# Patient Record
Sex: MALE | Race: WHITE | NOT HISPANIC OR LATINO | URBAN - METROPOLITAN AREA
[De-identification: names, ages, dates, MRNs, and addresses within clinical notes are randomized per-mention and may not be internally consistent; named-entity substitution may affect disease eponyms.]

---

## 2017-07-13 ENCOUNTER — EMERGENCY (EMERGENCY)
Facility: HOSPITAL | Age: 82
LOS: 1 days | Discharge: HOME | End: 2017-07-13
Attending: EMERGENCY MEDICINE
Payer: MEDICARE

## 2017-07-13 VITALS
SYSTOLIC BLOOD PRESSURE: 122 MMHG | RESPIRATION RATE: 18 BRPM | DIASTOLIC BLOOD PRESSURE: 69 MMHG | OXYGEN SATURATION: 96 % | HEART RATE: 60 BPM

## 2017-07-13 VITALS
WEIGHT: 160.06 LBS | HEART RATE: 82 BPM | RESPIRATION RATE: 18 BRPM | TEMPERATURE: 98 F | OXYGEN SATURATION: 100 % | HEIGHT: 69 IN | DIASTOLIC BLOOD PRESSURE: 61 MMHG | SYSTOLIC BLOOD PRESSURE: 114 MMHG

## 2017-07-13 PROCEDURE — 70486 CT MAXILLOFACIAL W/O DYE: CPT

## 2017-07-13 PROCEDURE — 99284 EMERGENCY DEPT VISIT MOD MDM: CPT | Mod: 25

## 2017-07-13 PROCEDURE — 70450 CT HEAD/BRAIN W/O DYE: CPT

## 2017-07-13 PROCEDURE — 70450 CT HEAD/BRAIN W/O DYE: CPT | Mod: 26

## 2017-07-13 PROCEDURE — 99284 EMERGENCY DEPT VISIT MOD MDM: CPT

## 2017-07-13 PROCEDURE — 70486 CT MAXILLOFACIAL W/O DYE: CPT | Mod: 26

## 2017-07-13 NOTE — ED PROVIDER NOTE - CARE PLAN
Instructions for follow-up, activity and diet:	1. Follow up with your PMD within 48-72 hours.  2. Rest, Take Tylenol 650mg 1 tab every 4-6 hours as needed for pain .   3. Any nausea, vomiting, worsening pain, dizziness, changes in vision return to ER Principal Discharge DX:	Contusion of face, initial encounter  Instructions for follow-up, activity and diet:	1. Follow up with your PMD within 48-72 hours.  2. Rest, Take Tylenol 650mg 1 tab every 4-6 hours as needed for pain .   3. Any nausea, vomiting, worsening pain, dizziness, changes in vision return to ER

## 2017-07-13 NOTE — ED PROVIDER NOTE - MEDICAL DECISION MAKING DETAILS
Head trauma with no LOC and normal neuro exam on AC- CT head/CT maxfac; head precautions, tylenol, PMD follow up

## 2017-07-13 NOTE — ED PROVIDER NOTE - PROGRESS NOTE DETAILS
Imaging studies describe no acute fracture or intracranial bleeding. Patient stable for discharge. I agree with PA assessment and plan. Dr. Lincoln Winter

## 2017-07-13 NOTE — ED ADULT NURSE NOTE - OBJECTIVE STATEMENT
89y male pt brought by daughter in law who is a physician s/p mechanical fall last evening. Pt states that he tripped over treadmill in a room, fell on right side face, no LOC, on Pradaxa for Afib. +ecchymosis around right eye, seen by Optho this morning - no acute finding on right eye. About 0.5inch laceration noted below right eyebrow, no active bleeding noted. Denies any dizziness, chest pain or sob, no nausea/vomit. Ambulating with cane at baseline, MAEx4, no distress or pain noted.

## 2017-07-13 NOTE — ED PROVIDER NOTE - OBJECTIVE STATEMENT
89yom pmhx of DM HTN HLD hx of Afib on pradaxa here with daughter s/p mechanical fall last night in his room with head injury and facial injury. pt was able to get up. no complaints. Went to ophtho today with eye exam fine but sent in due to head injury on AC. pt denies any pain or any neuro issues. Wants to go home; daughter at bedside.

## 2017-07-13 NOTE — ED PROVIDER NOTE - ATTENDING CONTRIBUTION TO CARE
89 year-old male patient who sustained fall at home yesterday with no LOC, resulting in right facial brusing and ecchymoses. Patient with no gross neurologic deficits. Imaging studies describe no acute fracture or intracranial bleeding. Patient stable for discharge. I agree with PA assessment and plan. Will discharge home. Dr. Lincoln Winter

## 2018-01-31 ENCOUNTER — RX RENEWAL (OUTPATIENT)
Age: 83
End: 2018-01-31

## 2018-01-31 DIAGNOSIS — I10 ESSENTIAL (PRIMARY) HYPERTENSION: ICD-10-CM

## 2018-01-31 RX ORDER — METOPROLOL TARTRATE 25 MG/1
25 TABLET, FILM COATED ORAL
Qty: 90 | Refills: 0 | Status: ACTIVE | COMMUNITY
Start: 2018-01-31 | End: 1900-01-01

## 2018-02-09 ENCOUNTER — APPOINTMENT (OUTPATIENT)
Dept: CARDIOLOGY | Facility: CLINIC | Age: 83
End: 2018-02-09

## 2018-02-14 ENCOUNTER — APPOINTMENT (OUTPATIENT)
Dept: CARDIOLOGY | Facility: CLINIC | Age: 83
End: 2018-02-14

## 2018-08-18 ENCOUNTER — EMERGENCY (EMERGENCY)
Facility: HOSPITAL | Age: 83
LOS: 0 days | Discharge: HOME | End: 2018-08-18
Attending: EMERGENCY MEDICINE | Admitting: EMERGENCY MEDICINE
Payer: MEDICARE

## 2018-08-18 VITALS
DIASTOLIC BLOOD PRESSURE: 74 MMHG | OXYGEN SATURATION: 97 % | RESPIRATION RATE: 18 BRPM | HEART RATE: 69 BPM | TEMPERATURE: 98 F | SYSTOLIC BLOOD PRESSURE: 109 MMHG

## 2018-08-18 VITALS — HEIGHT: 69 IN | WEIGHT: 149.91 LBS

## 2018-08-18 DIAGNOSIS — E11.9 TYPE 2 DIABETES MELLITUS WITHOUT COMPLICATIONS: ICD-10-CM

## 2018-08-18 DIAGNOSIS — S22.32XA FRACTURE OF ONE RIB, LEFT SIDE, INITIAL ENCOUNTER FOR CLOSED FRACTURE: ICD-10-CM

## 2018-08-18 DIAGNOSIS — Z79.4 LONG TERM (CURRENT) USE OF INSULIN: ICD-10-CM

## 2018-08-18 DIAGNOSIS — S49.92XA UNSPECIFIED INJURY OF LEFT SHOULDER AND UPPER ARM, INITIAL ENCOUNTER: ICD-10-CM

## 2018-08-18 DIAGNOSIS — R29.6 REPEATED FALLS: ICD-10-CM

## 2018-08-18 DIAGNOSIS — I10 ESSENTIAL (PRIMARY) HYPERTENSION: ICD-10-CM
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
## 2018-08-18 DIAGNOSIS — E78.5 HYPERLIPIDEMIA, UNSPECIFIED: ICD-10-CM

## 2018-08-18 DIAGNOSIS — S00.83XA CONTUSION OF OTHER PART OF HEAD, INITIAL ENCOUNTER: ICD-10-CM
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
## 2018-08-18 DIAGNOSIS — S50.812A ABRASION OF LEFT FOREARM, INITIAL ENCOUNTER: ICD-10-CM

## 2018-08-18 PROCEDURE — 71250 CT THORAX DX C-: CPT | Mod: 26

## 2018-08-18 PROCEDURE — 70450 CT HEAD/BRAIN W/O DYE: CPT | Mod: 26

## 2018-08-18 PROCEDURE — 72125 CT NECK SPINE W/O DYE: CPT | Mod: 26

## 2018-08-18 PROCEDURE — 99284 EMERGENCY DEPT VISIT MOD MDM: CPT

## 2018-08-18 RX ORDER — TETANUS TOXOID, REDUCED DIPHTHERIA TOXOID AND ACELLULAR PERTUSSIS VACCINE, ADSORBED 5; 2.5; 8; 8; 2.5 [IU]/.5ML; [IU]/.5ML; UG/.5ML; UG/.5ML; UG/.5ML
0.5 SUSPENSION INTRAMUSCULAR ONCE
Qty: 0 | Refills: 0 | Status: COMPLETED | OUTPATIENT
Start: 2018-08-18 | End: 2018-08-18

## 2018-08-18 RX ADMIN — TETANUS TOXOID, REDUCED DIPHTHERIA TOXOID AND ACELLULAR PERTUSSIS VACCINE, ADSORBED 0.5 MILLILITER(S): 5; 2.5; 8; 8; 2.5 SUSPENSION INTRAMUSCULAR at 18:31

## 2018-08-18 NOTE — ED STATDOCS - PROGRESS NOTE DETAILS
89M hx htn hld dm presents to the ED s/p fall out of bed - fell 2 weeks ago and again 2 days ago - now with bruising over right head as well as significant skin tear to left forearm - given age and bruising will have pt evaluated further in main. Bhupinder Ken M.D., Attending Physician

## 2018-08-18 NOTE — ED PROVIDER NOTE - ATTENDING CONTRIBUTION TO CARE
I, Jon Donald, performed the initial face to face bedside interview with this patient regarding history of present illness, review of symptoms and relevant past medical, social and family history.  I completed an independent physical examination.  I was the initial provider who evaluated this patient. I have signed out the follow up of any pending tests (i.e. labs, radiological studies) to the ACP.  I have communicated the patient’s plan of care and disposition with the ACP.  The history, relevant review of systems, past medical and surgical history, medical decision making, and physical examination was documented by the scribe in my presence and I attest to the accuracy of the documentation.

## 2018-08-18 NOTE — ED ADULT NURSE REASSESSMENT NOTE - NS ED NURSE REASSESS COMMENT FT1
patient given incentive spirometer, demonstrated back correct use of incentive spirometer. discharged home with wife. ambulatory with steady gait using cane. written and verbal discharge and followup instructions given to patient, patient verbalized back understanding. discharged home from ED at 2029.

## 2018-08-18 NOTE — ED PROVIDER NOTE - SKIN, MLM
2 deep skin tears 4cm subcutaneous on dorsum to L forearm. +5 cm ecchymosis r frontal scalp. no obvious evidence of skull fx. +bruising to b/l arms. ,+ 4 cm diameter of left chest wall, no chest wall tenderness. Skin normal color for race, warm, dry and intact. No evidence of rash.

## 2018-08-18 NOTE — ED PROVIDER NOTE - PROGRESS NOTE DETAILS
89 yo male with a PMH of DM, HTN, HLD, presents with 2 fall and abrasion to the L upper extremity. Pt slipped off his bed 2 weeks ago and sustained 2 deep abrasions. Pt also fell 2 days ago and sustained another skin tear to the same L forearm. CT head, neck, and chest fue to ecchymosis on the L chest. -Keo Echols PA-C Pt advised of results of rib facture and given incentive spirometer. Advised to f/u with wound care center for further evaluation or ortho hand for possible graft. Pt and wife agree with plan.

## 2018-08-18 NOTE — ED PROVIDER NOTE - OBJECTIVE STATEMENT
89 y/o M with a PMHx of DM, HTN, HLD  presents to the ED s/p fall two weeks ago. Pt slid off his bed  suffering a laceration on L forearm. Two days ago pt fell again off his bed and sustained another skin injury, +hit head and sustained +R frontal scalp ecchymosis. No obvious deformity. Denies back pain, neck pain, two days ago fell again with injury. Denies LOC, dizziness. Pt is not up to date on Tetanus. 89 y/o M with a PMHx of DM, HTN, HLD presents to the ED s/p fall two weeks ago. Pt slid off his bed suffering a laceration on L forearm. Two days ago pt fell again off his bed and sustained another skin injury, +hit head and sustained +R frontal scalp ecchymosis. No obvious deformity. Denies back pain, neck pain. Denies LOC, dizziness. Pt is not up to date on Tetanus.

## 2018-08-18 NOTE — ED PROVIDER NOTE - CARE PLAN
Principal Discharge DX:	Closed fracture of one rib of left side, initial encounter  Secondary Diagnosis:	Abrasion of left forearm, initial encounter  Secondary Diagnosis:	Fall from bed, initial encounter

## 2018-08-18 NOTE — ED ADULT NURSE NOTE - NSIMPLEMENTINTERV_GEN_ALL_ED
Implemented All Fall with Harm Risk Interventions:  [place] to call system. Call bell, personal items and telephone within reach. Instruct patient to call for assistance. Room bathroom lighting operational. Non-slip footwear when patient is off stretcher. Physically safe environment: no spills, clutter or unnecessary equipment. Stretcher in lowest position, wheels locked, appropriate side rails in place. Provide visual cue, wrist band, yellow gown, etc. Monitor gait and stability. Monitor for mental status changes and reorient to person, place, and time. Review medications for side effects contributing to fall risk. Reinforce activity limits and safety measures with patient and family. Provide visual clues: red socks.

## 2018-08-18 NOTE — ED ADULT TRIAGE NOTE - CHIEF COMPLAINT QUOTE
fall, hit arm on dresser. Denies LOC. Denies any other pain. Left arm is bandaged. pulse/motor/sensory intact. Denies blood thinners

## 2019-03-07 ENCOUNTER — INPATIENT (INPATIENT)
Facility: HOSPITAL | Age: 84
LOS: 3 days | Discharge: NURSING, REHAB OR LONG-TERM CARE | End: 2019-03-11
Attending: HOSPITALIST | Admitting: HOSPITALIST
Payer: MEDICARE

## 2019-03-07 VITALS
HEIGHT: 69 IN | RESPIRATION RATE: 15 BRPM | OXYGEN SATURATION: 92 % | SYSTOLIC BLOOD PRESSURE: 118 MMHG | DIASTOLIC BLOOD PRESSURE: 71 MMHG | TEMPERATURE: 98 F | HEART RATE: 71 BPM | WEIGHT: 149.91 LBS

## 2019-03-07 DIAGNOSIS — S81.811A LACERATION WITHOUT FOREIGN BODY, RIGHT LOWER LEG, INITIAL ENCOUNTER: ICD-10-CM

## 2019-03-07 DIAGNOSIS — E44.0 MODERATE PROTEIN-CALORIE MALNUTRITION: ICD-10-CM

## 2019-03-07 DIAGNOSIS — R54 AGE-RELATED PHYSICAL DEBILITY: ICD-10-CM

## 2019-03-07 DIAGNOSIS — L03.119 CELLULITIS OF UNSPECIFIED PART OF LIMB: ICD-10-CM

## 2019-03-07 DIAGNOSIS — I25.10 ATHEROSCLEROTIC HEART DISEASE OF NATIVE CORONARY ARTERY WITHOUT ANGINA PECTORIS: ICD-10-CM

## 2019-03-07 DIAGNOSIS — S81.812A LACERATION WITHOUT FOREIGN BODY, LEFT LOWER LEG, INITIAL ENCOUNTER: ICD-10-CM
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
## 2019-03-07 DIAGNOSIS — I48.91 UNSPECIFIED ATRIAL FIBRILLATION: ICD-10-CM

## 2019-03-07 DIAGNOSIS — E11.9 TYPE 2 DIABETES MELLITUS WITHOUT COMPLICATIONS: ICD-10-CM

## 2019-03-07 LAB
ALBUMIN SERPL ELPH-MCNC: 2.6 G/DL — LOW (ref 3.3–5)
ALP SERPL-CCNC: 83 U/L — SIGNIFICANT CHANGE UP (ref 40–120)
ALT FLD-CCNC: 23 U/L — SIGNIFICANT CHANGE UP (ref 12–78)
ANION GAP SERPL CALC-SCNC: 7 MMOL/L — SIGNIFICANT CHANGE UP (ref 5–17)
APPEARANCE UR: CLEAR — SIGNIFICANT CHANGE UP
APTT BLD: 32 SEC — SIGNIFICANT CHANGE UP (ref 27.5–36.3)
AST SERPL-CCNC: 35 U/L — SIGNIFICANT CHANGE UP (ref 15–37)
BACTERIA # UR AUTO: ABNORMAL
BASOPHILS # BLD AUTO: 0.02 K/UL — SIGNIFICANT CHANGE UP (ref 0–0.2)
BASOPHILS NFR BLD AUTO: 0.1 % — SIGNIFICANT CHANGE UP (ref 0–2)
BILIRUB SERPL-MCNC: 1.3 MG/DL — HIGH (ref 0.2–1.2)
BILIRUB UR-MCNC: NEGATIVE — SIGNIFICANT CHANGE UP
BUN SERPL-MCNC: 28 MG/DL — HIGH (ref 7–23)
CALCIUM SERPL-MCNC: 8.1 MG/DL — LOW (ref 8.5–10.1)
CHLORIDE SERPL-SCNC: 99 MMOL/L — SIGNIFICANT CHANGE UP (ref 96–108)
CO2 SERPL-SCNC: 27 MMOL/L — SIGNIFICANT CHANGE UP (ref 22–31)
COLOR SPEC: YELLOW — SIGNIFICANT CHANGE UP
COMMENT - URINE: SIGNIFICANT CHANGE UP
CREAT SERPL-MCNC: 1.21 MG/DL — SIGNIFICANT CHANGE UP (ref 0.5–1.3)
DIFF PNL FLD: ABNORMAL
EOSINOPHIL # BLD AUTO: 0 K/UL — SIGNIFICANT CHANGE UP (ref 0–0.5)
EOSINOPHIL NFR BLD AUTO: 0 % — SIGNIFICANT CHANGE UP (ref 0–6)
EPI CELLS # UR: SIGNIFICANT CHANGE UP
GLUCOSE SERPL-MCNC: 286 MG/DL — HIGH (ref 70–99)
GLUCOSE UR QL: 250 MG/DL
HCT VFR BLD CALC: 33.8 % — LOW (ref 39–50)
HGB BLD-MCNC: 11.1 G/DL — LOW (ref 13–17)
HYALINE CASTS # UR AUTO: ABNORMAL /LPF
IMM GRANULOCYTES NFR BLD AUTO: 0.6 % — SIGNIFICANT CHANGE UP (ref 0–1.5)
INR BLD: 1.32 RATIO — HIGH (ref 0.88–1.16)
KETONES UR-MCNC: NEGATIVE — SIGNIFICANT CHANGE UP
LEUKOCYTE ESTERASE UR-ACNC: NEGATIVE — SIGNIFICANT CHANGE UP
LYMPHOCYTES # BLD AUTO: 0.97 K/UL — LOW (ref 1–3.3)
LYMPHOCYTES # BLD AUTO: 4.9 % — LOW (ref 13–44)
MCHC RBC-ENTMCNC: 29.4 PG — SIGNIFICANT CHANGE UP (ref 27–34)
MCHC RBC-ENTMCNC: 32.8 GM/DL — SIGNIFICANT CHANGE UP (ref 32–36)
MCV RBC AUTO: 89.4 FL — SIGNIFICANT CHANGE UP (ref 80–100)
MONOCYTES # BLD AUTO: 1.04 K/UL — HIGH (ref 0–0.9)
MONOCYTES NFR BLD AUTO: 5.2 % — SIGNIFICANT CHANGE UP (ref 2–14)
NEUTROPHILS # BLD AUTO: 17.69 K/UL — HIGH (ref 1.8–7.4)
NEUTROPHILS NFR BLD AUTO: 89.2 % — HIGH (ref 43–77)
NITRITE UR-MCNC: NEGATIVE — SIGNIFICANT CHANGE UP
NRBC # BLD: 0 /100 WBCS — SIGNIFICANT CHANGE UP (ref 0–0)
PH UR: 5 — SIGNIFICANT CHANGE UP (ref 5–8)
PLATELET # BLD AUTO: 183 K/UL — SIGNIFICANT CHANGE UP (ref 150–400)
POTASSIUM SERPL-MCNC: 4 MMOL/L — SIGNIFICANT CHANGE UP (ref 3.5–5.3)
POTASSIUM SERPL-SCNC: 4 MMOL/L — SIGNIFICANT CHANGE UP (ref 3.5–5.3)
PROT SERPL-MCNC: 5.7 GM/DL — LOW (ref 6–8.3)
PROT UR-MCNC: 30 MG/DL
PROTHROM AB SERPL-ACNC: 14.8 SEC — HIGH (ref 10–12.9)
RBC # BLD: 3.78 M/UL — LOW (ref 4.2–5.8)
RBC # FLD: 13.2 % — SIGNIFICANT CHANGE UP (ref 10.3–14.5)
RBC CASTS # UR COMP ASSIST: ABNORMAL /HPF (ref 0–4)
SODIUM SERPL-SCNC: 133 MMOL/L — LOW (ref 135–145)
SP GR SPEC: 1.02 — SIGNIFICANT CHANGE UP (ref 1.01–1.02)
TSH SERPL-MCNC: 1.7 UU/ML — SIGNIFICANT CHANGE UP (ref 0.34–4.82)
UROBILINOGEN FLD QL: 1 MG/DL
WBC # BLD: 19.83 K/UL — HIGH (ref 3.8–10.5)
WBC # FLD AUTO: 19.83 K/UL — HIGH (ref 3.8–10.5)
WBC UR QL: SIGNIFICANT CHANGE UP

## 2019-03-07 PROCEDURE — 71045 X-RAY EXAM CHEST 1 VIEW: CPT | Mod: 26

## 2019-03-07 PROCEDURE — 99285 EMERGENCY DEPT VISIT HI MDM: CPT | Mod: 25

## 2019-03-07 RX ORDER — SODIUM CHLORIDE 9 MG/ML
1000 INJECTION INTRAMUSCULAR; INTRAVENOUS; SUBCUTANEOUS ONCE
Qty: 0 | Refills: 0 | Status: COMPLETED | OUTPATIENT
Start: 2019-03-07 | End: 2019-03-07

## 2019-03-07 RX ADMIN — SODIUM CHLORIDE 1000 MILLILITER(S): 9 INJECTION INTRAMUSCULAR; INTRAVENOUS; SUBCUTANEOUS at 22:03

## 2019-03-07 NOTE — ED PROVIDER NOTE - NS_ATTENDINGSCRIBE_ED_ALL_ED
Pupils equal, round and reactive to light, Extra-ocular movement intact, eyes are clear b/l I personally performed the service described in the documentation recorded by the scribe in my presence, and it accurately and completely records my words and actions.

## 2019-03-07 NOTE — ED PROVIDER NOTE - OBJECTIVE STATEMENT
89 y/o M with a PMHx of CAD, A-Fib, hypothyroidism presenting to the ED s/p mechanical fall. Pt was walking up the stairs when he felt weak and fell forward. Pt's son notes that pt has been increasingly fatigued, constipated, weak over the past few weeks. Pt denies hitting head, LOC, cough, SOB.     labs, ekg, chest XR 89 y/o M with a PMHx of CAD, A-Fib, hypothyroidism presenting to the ED s/p mechanical fall. Pt was walking up the stairs when he felt weak and fell forward. Pt's son notes that pt has been increasingly fatigued, constipated, and weak over the past few weeks. +lacerations on both legs. +pain. Pt denies hitting head, LOC, cough, or SOB.

## 2019-03-07 NOTE — ED ADULT NURSE NOTE - NSIMPLEMENTINTERV_GEN_ALL_ED
Implemented All Fall with Harm Risk Interventions:  [place] to call system. Call bell, personal items and telephone within reach. Instruct patient to call for assistance. Room bathroom lighting operational. Non-slip footwear when patient is off stretcher. Physically safe environment: no spills, clutter or unnecessary equipment. Stretcher in lowest position, wheels locked, appropriate side rails in place. Provide visual cue, wrist band, yellow gown, etc. Monitor gait and stability. Monitor for mental status changes and reorient to person, place, and time. Review medications for side effects contributing to fall risk. Reinforce activity limits and safety measures with patient and family. Provide visual clues: red socks.

## 2019-03-07 NOTE — ED PROVIDER NOTE - CLINICAL SUMMARY MEDICAL DECISION MAKING FREE TEXT BOX
89 y/o M with a PMHx of CAD, A-Fib, hypothyroidism presenting to the ED s/p mechanical fall. Plan: chest XR, labs including thyroid function, wound care, reassess.

## 2019-03-07 NOTE — ED PROVIDER NOTE - CONSTITUTIONAL, MLM
normal... +frail elderly male, appears comfortable awake, alert, oriented to person, place, time/situation and in no apparent distress.

## 2019-03-07 NOTE — ED ADULT TRIAGE NOTE - CHIEF COMPLAINT QUOTE
fall at home. on pradaxa?? pt did not hit head. neg loc. complains of pain in bilateral legs. alert and oriented x4

## 2019-03-07 NOTE — ED ADULT NURSE NOTE - OBJECTIVE STATEMENT
Questionable fall at home, pt did not hit head. neg loc. complains of pain in bilateral legs. alert and oriented x4  Pt states his B/L lower leg wounds started bleeding. On arrival B/L LE are bandaged, bleeding controlled

## 2019-03-08 LAB
ANION GAP SERPL CALC-SCNC: 9 MMOL/L — SIGNIFICANT CHANGE UP (ref 5–17)
BUN SERPL-MCNC: 24 MG/DL — HIGH (ref 7–23)
CALCIUM SERPL-MCNC: 8.1 MG/DL — LOW (ref 8.5–10.1)
CHLORIDE SERPL-SCNC: 104 MMOL/L — SIGNIFICANT CHANGE UP (ref 96–108)
CO2 SERPL-SCNC: 25 MMOL/L — SIGNIFICANT CHANGE UP (ref 22–31)
CREAT SERPL-MCNC: 0.69 MG/DL — SIGNIFICANT CHANGE UP (ref 0.5–1.3)
GLUCOSE BLDC GLUCOMTR-MCNC: 112 MG/DL — HIGH (ref 70–99)
GLUCOSE BLDC GLUCOMTR-MCNC: 194 MG/DL — HIGH (ref 70–99)
GLUCOSE BLDC GLUCOMTR-MCNC: 235 MG/DL — HIGH (ref 70–99)
GLUCOSE BLDC GLUCOMTR-MCNC: 246 MG/DL — HIGH (ref 70–99)
GLUCOSE BLDC GLUCOMTR-MCNC: 277 MG/DL — HIGH (ref 70–99)
GLUCOSE SERPL-MCNC: 131 MG/DL — HIGH (ref 70–99)
HCT VFR BLD CALC: 27.8 % — LOW (ref 39–50)
HGB BLD-MCNC: 9.2 G/DL — LOW (ref 13–17)
LACTATE SERPL-SCNC: 1.4 MMOL/L — SIGNIFICANT CHANGE UP (ref 0.7–2)
MCHC RBC-ENTMCNC: 29.2 PG — SIGNIFICANT CHANGE UP (ref 27–34)
MCHC RBC-ENTMCNC: 33.1 GM/DL — SIGNIFICANT CHANGE UP (ref 32–36)
MCV RBC AUTO: 88.3 FL — SIGNIFICANT CHANGE UP (ref 80–100)
NRBC # BLD: 0 /100 WBCS — SIGNIFICANT CHANGE UP (ref 0–0)
PLATELET # BLD AUTO: 157 K/UL — SIGNIFICANT CHANGE UP (ref 150–400)
POTASSIUM SERPL-MCNC: 3.9 MMOL/L — SIGNIFICANT CHANGE UP (ref 3.5–5.3)
POTASSIUM SERPL-SCNC: 3.9 MMOL/L — SIGNIFICANT CHANGE UP (ref 3.5–5.3)
RBC # BLD: 3.15 M/UL — LOW (ref 4.2–5.8)
RBC # FLD: 13.2 % — SIGNIFICANT CHANGE UP (ref 10.3–14.5)
SODIUM SERPL-SCNC: 138 MMOL/L — SIGNIFICANT CHANGE UP (ref 135–145)
WBC # BLD: 17.24 K/UL — HIGH (ref 3.8–10.5)
WBC # FLD AUTO: 17.24 K/UL — HIGH (ref 3.8–10.5)

## 2019-03-08 PROCEDURE — 93010 ELECTROCARDIOGRAM REPORT: CPT

## 2019-03-08 PROCEDURE — 93970 EXTREMITY STUDY: CPT | Mod: 26

## 2019-03-08 RX ORDER — INSULIN LISPRO 100/ML
VIAL (ML) SUBCUTANEOUS
Qty: 0 | Refills: 0 | Status: DISCONTINUED | OUTPATIENT
Start: 2019-03-08 | End: 2019-03-11

## 2019-03-08 RX ORDER — SENNA PLUS 8.6 MG/1
2 TABLET ORAL AT BEDTIME
Qty: 0 | Refills: 0 | Status: DISCONTINUED | OUTPATIENT
Start: 2019-03-08 | End: 2019-03-11

## 2019-03-08 RX ORDER — BACITRACIN ZINC 500 UNIT/G
1 OINTMENT IN PACKET (EA) TOPICAL DAILY
Qty: 0 | Refills: 0 | Status: DISCONTINUED | OUTPATIENT
Start: 2019-03-08 | End: 2019-03-11

## 2019-03-08 RX ORDER — LEVOTHYROXINE SODIUM 125 MCG
175 TABLET ORAL DAILY
Qty: 0 | Refills: 0 | Status: DISCONTINUED | OUTPATIENT
Start: 2019-03-08 | End: 2019-03-11

## 2019-03-08 RX ORDER — GLUCAGON INJECTION, SOLUTION 0.5 MG/.1ML
1 INJECTION, SOLUTION SUBCUTANEOUS ONCE
Qty: 0 | Refills: 0 | Status: DISCONTINUED | OUTPATIENT
Start: 2019-03-08 | End: 2019-03-11

## 2019-03-08 RX ORDER — DEXTROSE 50 % IN WATER 50 %
12.5 SYRINGE (ML) INTRAVENOUS ONCE
Qty: 0 | Refills: 0 | Status: DISCONTINUED | OUTPATIENT
Start: 2019-03-08 | End: 2019-03-11

## 2019-03-08 RX ORDER — DEXTROSE 50 % IN WATER 50 %
15 SYRINGE (ML) INTRAVENOUS ONCE
Qty: 0 | Refills: 0 | Status: DISCONTINUED | OUTPATIENT
Start: 2019-03-08 | End: 2019-03-11

## 2019-03-08 RX ORDER — ACETAMINOPHEN 500 MG
650 TABLET ORAL EVERY 6 HOURS
Qty: 0 | Refills: 0 | Status: DISCONTINUED | OUTPATIENT
Start: 2019-03-08 | End: 2019-03-11

## 2019-03-08 RX ORDER — HEPARIN SODIUM 5000 [USP'U]/ML
5000 INJECTION INTRAVENOUS; SUBCUTANEOUS EVERY 8 HOURS
Qty: 0 | Refills: 0 | Status: DISCONTINUED | OUTPATIENT
Start: 2019-03-08 | End: 2019-03-11

## 2019-03-08 RX ORDER — SODIUM CHLORIDE 9 MG/ML
1000 INJECTION, SOLUTION INTRAVENOUS
Qty: 0 | Refills: 0 | Status: DISCONTINUED | OUTPATIENT
Start: 2019-03-08 | End: 2019-03-11

## 2019-03-08 RX ORDER — DEXTROSE 50 % IN WATER 50 %
25 SYRINGE (ML) INTRAVENOUS ONCE
Qty: 0 | Refills: 0 | Status: DISCONTINUED | OUTPATIENT
Start: 2019-03-08 | End: 2019-03-11

## 2019-03-08 RX ORDER — DOCUSATE SODIUM 100 MG
100 CAPSULE ORAL
Qty: 0 | Refills: 0 | Status: DISCONTINUED | OUTPATIENT
Start: 2019-03-08 | End: 2019-03-11

## 2019-03-08 RX ADMIN — HEPARIN SODIUM 5000 UNIT(S): 5000 INJECTION INTRAVENOUS; SUBCUTANEOUS at 21:28

## 2019-03-08 RX ADMIN — Medication 4: at 08:13

## 2019-03-08 RX ADMIN — Medication 175 MICROGRAM(S): at 07:04

## 2019-03-08 RX ADMIN — HEPARIN SODIUM 5000 UNIT(S): 5000 INJECTION INTRAVENOUS; SUBCUTANEOUS at 13:29

## 2019-03-08 RX ADMIN — Medication 2: at 18:06

## 2019-03-08 RX ADMIN — Medication 1 APPLICATION(S): at 13:30

## 2019-03-08 RX ADMIN — Medication 100 MILLIGRAM(S): at 18:06

## 2019-03-08 RX ADMIN — SENNA PLUS 2 TABLET(S): 8.6 TABLET ORAL at 21:28

## 2019-03-08 RX ADMIN — HEPARIN SODIUM 5000 UNIT(S): 5000 INJECTION INTRAVENOUS; SUBCUTANEOUS at 06:38

## 2019-03-08 NOTE — DIETITIAN INITIAL EVALUATION ADULT. - OTHER INFO
Pt seen for consult PU stage II or greater. Pt with PMhx: DM, CAD, A-Fib, hypothyroidism presenting to the ED s/p mechanical fall. Pt was walking up the stairs when he felt weak and fell forward. Pt's son notes that pt has been increasingly fatigued, constipated, and weak over the past few weeks. Discussed with RN. Pt with wife at home. Pt ate fair breakfast this AM. No overt signs of issues chewing or swallowing meal, no noted n/v/d/c. Pt laurita score is 14, Pt with stage II PU on buttocks and stage I on bilateral heel.  Pt noted with 3+ edema in right and left leg and foot. Pt's actually dry wt may place pt in underweight category, no wt h/x or UBW can be identified. Of note NFPE showed moderate signs of muscle wasting in temple, deltoid, interosseous, Calves and quads difficulty to assess due to edema. Pt with moderate fat wastign in triceps and ribs. Overall pt meets criteria for moderate protein calorie malnutrition in the context of chronic illness.  Recommend Glucerna once a day, Gelatien SF BID, MVI, Vit c 500mg once a day, zinc 220mg once a day. will continue to monitor pt's nutritional status

## 2019-03-08 NOTE — CHART NOTE - NSCHARTNOTEFT_GEN_A_CORE
Upon Nutritional Assessment by the Registered Dietitian your patient was determined to meet criteria / has evidence of the following diagnosis/diagnoses:          [ ]  Mild Protein Calorie Malnutrition        [x]  Moderate Protein Calorie Malnutrition        [ ] Severe Protein Calorie Malnutrition        [ ] Unspecified Protein Calorie Malnutrition        [ ] Underweight / BMI <19        [ ] Morbid Obesity / BMI > 40      Findings as based on:  •  Comprehensive nutrition assessment and consultation  •  Calorie counts (nutrient intake analysis)  •  Food acceptance and intake status from observations by staff  •  Follow up  •  Patient education  •  Intervention secondary to interdisciplinary rounds  •   concerns      Treatment:    The following diet has been recommended:  - Encourage PO intake  - Glucerna once a day  - Gelatin BID    PROVIDER Section:     By signing this assessment you are acknowledging and agree with the diagnosis/diagnoses assigned by the Registered Dietitian    Comments:

## 2019-03-08 NOTE — H&P ADULT - HISTORY OF PRESENT ILLNESS
89 y/o M with a PMHx of DM, CAD, A-Fib, hypothyroidism presenting to the ED s/p mechanical fall. Pt was walking up the stairs when he felt weak and fell forward. Pt's son notes that pt has been increasingly fatigued, constipated, and weak over the past few weeks. he has +lacerations on both legs. Pt denies hitting head, LOC, cough, or SOB.    Family hx:  patient is unable to provide detail family history due to his clinical status.

## 2019-03-08 NOTE — PHYSICAL THERAPY INITIAL EVALUATION ADULT - MODALITIES TREATMENT COMMENTS
pt left in bed supine post Eval; bed alarm on; LE's elevated on pillow with heels unloaded; son / spouse present; callbell in reach; pt instructed not to get up alone; call nursing for assist; marina well; denied pain

## 2019-03-08 NOTE — ED ADULT NURSE REASSESSMENT NOTE - NS ED NURSE REASSESS COMMENT FT1
Ambulation trail completed. patient has a very unsteady gait. Dr. Louise notified. patient and family at bedside updated on plan of care.

## 2019-03-08 NOTE — PROGRESS NOTE ADULT - ASSESSMENT
89 y/o M with PMHx significant for A-Fib (No AC 2/2 Hx falls), hypothyroidism presenting to the ED s/p mechanical fall, (+) lacerations on both legs - sutures places to RLE. Pt denies hitting head, LOC, cough, or SOB.    S/p Mechanical Fall w/ BLE Leg laceration  - Leucocytosis likely stress   - Repeat CBC  - Fall precaution  - S/p laceration repair - wound care bacitracin daily   - PT evaluation  - BLE doppler pending   - ECG pending     Hypothyroidism  - Cont. levothyroxine  - TSH WNL     DM2 ( Non-insulin Dependent)   - Cont. ISS  - Cont. Diabetic Diet  - Hold Glipizide 5mg while inpatient     DVT Prophylaxis  - SQ Heparin     Dispo Plan  - PT consult pending  - Home w/ PT vs [facility]  - Spoke to shadi Herndon [phone]

## 2019-03-08 NOTE — H&P ADULT - NSHPPHYSICALEXAM_GEN_ALL_CORE
Vital Signs Last 24 Hrs  T(C): 37.3 (08 Mar 2019 04:43), Max: 37.7 (07 Mar 2019 20:00)  T(F): 99.1 (08 Mar 2019 04:43), Max: 99.9 (07 Mar 2019 20:00)  HR: 95 (08 Mar 2019 04:43) (71 - 97)  BP: 105/55 (08 Mar 2019 04:43) (105/55 - 122/69)  RR: 15 (08 Mar 2019 04:43) (15 - 16)  SpO2: 95% (08 Mar 2019 04:43) (92% - 98%)

## 2019-03-08 NOTE — DIETITIAN INITIAL EVALUATION ADULT. - PERTINENT MEDS FT
MEDICATIONS  (STANDING):  BACItracin   Ointment 1 Application(s) Topical daily  dextrose 5%. 1000 milliLiter(s) (50 mL/Hr) IV Continuous <Continuous>  dextrose 50% Injectable 12.5 Gram(s) IV Push once  dextrose 50% Injectable 25 Gram(s) IV Push once  dextrose 50% Injectable 25 Gram(s) IV Push once  docusate sodium 100 milliGRAM(s) Oral two times a day  heparin  Injectable 5000 Unit(s) SubCutaneous every 8 hours  insulin lispro (HumaLOG) corrective regimen sliding scale   SubCutaneous three times a day before meals  levothyroxine 175 MICROGram(s) Oral daily  senna 2 Tablet(s) Oral at bedtime    MEDICATIONS  (PRN):  acetaminophen   Tablet .. 650 milliGRAM(s) Oral every 6 hours PRN Temp greater or equal to 38C (100.4F), Mild Pain (1 - 3)  dextrose 40% Gel 15 Gram(s) Oral once PRN Blood Glucose LESS THAN 70 milliGRAM(s)/deciliter  glucagon  Injectable 1 milliGRAM(s) IntraMuscular once PRN Glucose LESS THAN 70 milligrams/deciliter

## 2019-03-08 NOTE — DIETITIAN INITIAL EVALUATION ADULT. - PERTINENT LABORATORY DATA
03-07 Na133 mmol/L<L> Glu 286 mg/dL<H> K+ 4.0 mmol/L Cr  1.21 mg/dL BUN 28 mg/dL<H> Phos n/a   Alb 2.6 g/dL<L> PAB n/a

## 2019-03-08 NOTE — DIETITIAN INITIAL EVALUATION ADULT. - NS AS NUTRI DX NUTRIENT
Meets criteria for moderate protein-calorie malnutrition in the context of chronic illness/Malnutrition

## 2019-03-08 NOTE — H&P ADULT - ASSESSMENT
89 yo male presented after fall.    A/P:    1.  Fall  Leg laceration  Leucocytosis - may be stress induced from fall, will follow the repeat level   -will keep on Fall precaution  -s/p laceration repair  -will follow PT evaluation  -follow wound care     2.  Hypothyroidism  -on levothyroxine    3.  DM  -follow Dxt   -on ISS    4.  Heparin for DVT ppx    5.  Code status: Unable to assess the code status at this time. Full code for now.     **** Unable to verify any home medication list. Called the Son, HCP, but he was not able to provide the complete home medication list either. Need medication reconciliation by the primary team.

## 2019-03-08 NOTE — PROGRESS NOTE ADULT - SUBJECTIVE AND OBJECTIVE BOX
HPI:  89 y/o M with a PMHx of DM2 (non-insulin dependent), CAD, A-Fib (No AC 2/2 Hx falls), hypothyroidism presenting to the ED s/p mechanical fall. Pt was walking up the stairs when he felt weak and fell forward. Pt's son notes that pt has been increasingly fatigued, constipated, and weak over the past few weeks. Patient with (+) lacerations on both legs. Pt denies hitting head, LOC, cough, or SOB.    In ED, pt. found to have WBC 19.8, glucose 286, UA (-), CXR (-), TSH 1.7; Pt received 1L bolus and sutures to Right leg Lac    Interval HPI 3/8: Pt with no c/o at this time: Plan for BLE doppler, repeat labs, PT consult      Review of Systems:   CONSTITUTIONAL: Denies weakness, fevers or chills  EYES/ENT: Denies visual changes;  No vertigo or throat pain   NECK: Denies pain or stiffness  RESPIRATORY: Denies cough, wheezing, hemoptysis; No shortness of breath,   CARDIOVASCULAR: Denies chest pain or palpitations  GASTROINTESTINAL: Denies abdominal or epigastric pain. No nausea, vomiting, or hematemesis; No diarrhea or constipation.   GENITOURINARY: Denies dysuria, frequency or hematuria  NEUROLOGICAL: Denies numbness or weakness  SKIN: Denies itching, burning, rashes, or lesions   All other review of systems is negative unless indicated above    PHYSICAL EXAM:    Vital Signs Last 24 Hrs  T(C): 37.1 (08 Mar 2019 08:58), Max: 37.7 (07 Mar 2019 20:00)  T(F): 98.8 (08 Mar 2019 08:58), Max: 99.9 (07 Mar 2019 20:00)  HR: 97 (08 Mar 2019 08:58) (71 - 98)  BP: 104/60 (08 Mar 2019 08:58) (104/60 - 122/69)  BP(mean): --  RR: 17 (08 Mar 2019 08:58) (15 - 18)  SpO2: 96% (08 Mar 2019 08:58) (92% - 98%)    GENERAL: comfortable, NAD   HEAD:  Atraumatic, Normocephalic  EYES: EOMI, conjunctiva and sclera clear  HEENT: Moist mucous membranes  NECK: Supple, No JVD  NERVOUS SYSTEM:  Alert & Oriented X2 - forgetful, Motor Strength 5/5 B/L upper extremities  CHEST/LUNG: Clear to auscultation bilaterally; No rales, rhonchi, wheezing, or rubs  HEART: S1 S/2 normal, no murmur  ABDOMEN: Soft, Nontender, Nondistended; Bowel sounds present  GENITOURINARY: Voiding, nonpalpable bladder  EXTREMITIES:  Pedal edema +3 Right   SKIN: BLE shin lacerations  PSYCH: Mood stable      LABS:                        11.1   19.83 )-----------( 183      ( 07 Mar 2019 19:25 )             33.8         133<L>  |  99  |  28<H>  ----------------------------<  286<H>  4.0   |  27  |  1.21    Ca    8.1<L>      07 Mar 2019 19:25    TPro  5.7<L>  /  Alb  2.6<L>  /  TBili  1.3<H>  /  DBili  x   /  AST  35  /  ALT  23  /  AlkPhos  83  -    PT/INR - ( 07 Mar 2019 19:25 )   PT: 14.8 sec;   INR: 1.32 ratio         PTT - ( 07 Mar 2019 19:25 )  PTT:32.0 sec  Urinalysis Basic - ( 07 Mar 2019 21:56 )    Color: Yellow / Appearance: Clear / S.025 / pH: x  Gluc: x / Ketone: Negative  / Bili: Negative / Urobili: 1 mg/dL   Blood: x / Protein: 30 mg/dL / Nitrite: Negative   Leuk Esterase: Negative / RBC: 3-5 /HPF / WBC 0-2   Sq Epi: x / Non Sq Epi: Occasional / Bacteria: Moderate        CAPILLARY BLOOD GLUCOSE      POCT Blood Glucose.: 112 mg/dL (08 Mar 2019 11:26)  POCT Blood Glucose.: 235 mg/dL (08 Mar 2019 08:06)      MEDICATIONS  (STANDING):  BACItracin   Ointment 1 Application(s) Topical daily  dextrose 5%. 1000 milliLiter(s) (50 mL/Hr) IV Continuous <Continuous>  dextrose 50% Injectable 12.5 Gram(s) IV Push once  dextrose 50% Injectable 25 Gram(s) IV Push once  dextrose 50% Injectable 25 Gram(s) IV Push once  docusate sodium 100 milliGRAM(s) Oral two times a day  heparin  Injectable 5000 Unit(s) SubCutaneous every 8 hours  insulin lispro (HumaLOG) corrective regimen sliding scale   SubCutaneous three times a day before meals  levothyroxine 175 MICROGram(s) Oral daily  senna 2 Tablet(s) Oral at bedtime    MEDICATIONS  (PRN):  acetaminophen   Tablet .. 650 milliGRAM(s) Oral every 6 hours PRN Temp greater or equal to 38C (100.4F), Mild Pain (1 - 3)  dextrose 40% Gel 15 Gram(s) Oral once PRN Blood Glucose LESS THAN 70 milliGRAM(s)/deciliter  glucagon  Injectable 1 milliGRAM(s) IntraMuscular once PRN Glucose LESS THAN 70 milligrams/deciliter

## 2019-03-09 LAB
CULTURE RESULTS: NO GROWTH — SIGNIFICANT CHANGE UP
GLUCOSE BLDC GLUCOMTR-MCNC: 161 MG/DL — HIGH (ref 70–99)
GLUCOSE BLDC GLUCOMTR-MCNC: 177 MG/DL — HIGH (ref 70–99)
GLUCOSE BLDC GLUCOMTR-MCNC: 181 MG/DL — HIGH (ref 70–99)
GLUCOSE BLDC GLUCOMTR-MCNC: 192 MG/DL — HIGH (ref 70–99)
GLUCOSE BLDC GLUCOMTR-MCNC: 309 MG/DL — HIGH (ref 70–99)
HCT VFR BLD CALC: 28.7 % — LOW (ref 39–50)
HGB BLD-MCNC: 9.7 G/DL — LOW (ref 13–17)
MCHC RBC-ENTMCNC: 29.5 PG — SIGNIFICANT CHANGE UP (ref 27–34)
MCHC RBC-ENTMCNC: 33.8 GM/DL — SIGNIFICANT CHANGE UP (ref 32–36)
MCV RBC AUTO: 87.2 FL — SIGNIFICANT CHANGE UP (ref 80–100)
NRBC # BLD: 0 /100 WBCS — SIGNIFICANT CHANGE UP (ref 0–0)
PLATELET # BLD AUTO: 167 K/UL — SIGNIFICANT CHANGE UP (ref 150–400)
RBC # BLD: 3.29 M/UL — LOW (ref 4.2–5.8)
RBC # FLD: 13.1 % — SIGNIFICANT CHANGE UP (ref 10.3–14.5)
SPECIMEN SOURCE: SIGNIFICANT CHANGE UP
WBC # BLD: 15.65 K/UL — HIGH (ref 3.8–10.5)
WBC # FLD AUTO: 15.65 K/UL — HIGH (ref 3.8–10.5)

## 2019-03-09 RX ORDER — CEFTRIAXONE 500 MG/1
INJECTION, POWDER, FOR SOLUTION INTRAMUSCULAR; INTRAVENOUS
Qty: 0 | Refills: 0 | Status: DISCONTINUED | OUTPATIENT
Start: 2019-03-09 | End: 2019-03-11

## 2019-03-09 RX ORDER — CEFTRIAXONE 500 MG/1
1000 INJECTION, POWDER, FOR SOLUTION INTRAMUSCULAR; INTRAVENOUS ONCE
Qty: 0 | Refills: 0 | Status: COMPLETED | OUTPATIENT
Start: 2019-03-09 | End: 2019-03-09

## 2019-03-09 RX ORDER — CEFTRIAXONE 500 MG/1
INJECTION, POWDER, FOR SOLUTION INTRAMUSCULAR; INTRAVENOUS
Qty: 0 | Refills: 0 | Status: DISCONTINUED | OUTPATIENT
Start: 2019-03-09 | End: 2019-03-09

## 2019-03-09 RX ORDER — INSULIN GLARGINE 100 [IU]/ML
10 INJECTION, SOLUTION SUBCUTANEOUS EVERY MORNING
Qty: 0 | Refills: 0 | Status: DISCONTINUED | OUTPATIENT
Start: 2019-03-09 | End: 2019-03-11

## 2019-03-09 RX ORDER — ASPIRIN/CALCIUM CARB/MAGNESIUM 324 MG
81 TABLET ORAL DAILY
Qty: 0 | Refills: 0 | Status: DISCONTINUED | OUTPATIENT
Start: 2019-03-09 | End: 2019-03-11

## 2019-03-09 RX ORDER — LANOLIN ALCOHOL/MO/W.PET/CERES
3 CREAM (GRAM) TOPICAL ONCE
Qty: 0 | Refills: 0 | Status: COMPLETED | OUTPATIENT
Start: 2019-03-09 | End: 2019-03-09

## 2019-03-09 RX ORDER — CEFTRIAXONE 500 MG/1
1000 INJECTION, POWDER, FOR SOLUTION INTRAMUSCULAR; INTRAVENOUS EVERY 24 HOURS
Qty: 0 | Refills: 0 | Status: DISCONTINUED | OUTPATIENT
Start: 2019-03-10 | End: 2019-03-11

## 2019-03-09 RX ADMIN — CEFTRIAXONE 1000 MILLIGRAM(S): 500 INJECTION, POWDER, FOR SOLUTION INTRAMUSCULAR; INTRAVENOUS at 14:21

## 2019-03-09 RX ADMIN — Medication 2: at 18:16

## 2019-03-09 RX ADMIN — Medication 1 APPLICATION(S): at 11:29

## 2019-03-09 RX ADMIN — INSULIN GLARGINE 10 UNIT(S): 100 INJECTION, SOLUTION SUBCUTANEOUS at 14:33

## 2019-03-09 RX ADMIN — Medication 100 MILLIGRAM(S): at 05:30

## 2019-03-09 RX ADMIN — Medication 650 MILLIGRAM(S): at 12:32

## 2019-03-09 RX ADMIN — Medication 81 MILLIGRAM(S): at 14:24

## 2019-03-09 RX ADMIN — HEPARIN SODIUM 5000 UNIT(S): 5000 INJECTION INTRAVENOUS; SUBCUTANEOUS at 21:23

## 2019-03-09 RX ADMIN — Medication 8: at 11:28

## 2019-03-09 RX ADMIN — HEPARIN SODIUM 5000 UNIT(S): 5000 INJECTION INTRAVENOUS; SUBCUTANEOUS at 05:30

## 2019-03-09 RX ADMIN — SENNA PLUS 2 TABLET(S): 8.6 TABLET ORAL at 21:23

## 2019-03-09 RX ADMIN — Medication 3 MILLIGRAM(S): at 22:29

## 2019-03-09 RX ADMIN — Medication 2: at 08:30

## 2019-03-09 RX ADMIN — Medication 175 MICROGRAM(S): at 05:30

## 2019-03-09 RX ADMIN — Medication 100 MILLIGRAM(S): at 18:18

## 2019-03-09 RX ADMIN — HEPARIN SODIUM 5000 UNIT(S): 5000 INJECTION INTRAVENOUS; SUBCUTANEOUS at 14:24

## 2019-03-09 NOTE — PROGRESS NOTE ADULT - ASSESSMENT
89 y/o M with PMHx significant for A-Fib (No AC 2/2 Hx falls), hypothyroidism presenting to the ED s/p mechanical fall, (+) lacerations on both legs - sutures places to RLE. Pt denies hitting head, LOC, cough, or SOB.    S/p Mechanical Fall w/ BLE Leg laceration  -due to advanced age most likely  - Leucocytosis likely stress   - Repeat CBC to follow  - Fall precaution  - S/p laceration repair - wound care bacitracin daily   - PT evaluation      Hypothyroidism  - Cont. levothyroxine  - TSH WNL     DM2 ( Non-insulin Dependent)   - Cont. ISS  --add lantus 10 unit for better blood sugar control     DVT Prophylaxis  - SQ Heparin 89 y/o M with PMHx significant for A-Fib (No AC 2/2 Hx falls), hypothyroidism presenting to the ED s/p mechanical fall, (+) lacerations on both legs - sutures places to RLE. Pt denies hitting head, LOC, cough, or SOB.    #S/p Mechanical Fall w/ BLE Leg laceration  -due to advanced age most likely  - Leucocytosis likely stress   - Repeat CBC to follow  - Fall precaution  - S/p laceration repair - wound care bacitracin daily   - PT evaluation    # Suspected cellulitis of leg   -start abx and monitor him     #Hypothyroidism  - Cont. levothyroxine  - TSH WNL     #DM2 ( Non-insulin Dependent)   - Cont. ISS  --add lantus 10 unit for better blood sugar control     #H/o Afib   -now slightly elevated rate   -start po cardizem and monitor him   -no on AC due to h/o fall     #Leucocytosis - blood culture and monitor it     #DVT Prophylaxis  - SQ Heparin

## 2019-03-09 NOTE — PROGRESS NOTE ADULT - SUBJECTIVE AND OBJECTIVE BOX
HPI:  89 y/o M with a PMHx of DM2 (non-insulin dependent), CAD, A-Fib (No AC 2/2 Hx falls), hypothyroidism presenting to the ED s/p mechanical fall. Pt was walking up the stairs when he felt weak and fell forward. Pt's son notes that pt has been increasingly fatigued, constipated, and weak over the past few weeks. Patient with (+) lacerations on both legs. Pt denies hitting head, LOC, cough, or SOB.          Review of Systems:   CONSTITUTIONAL: Denies weakness, fevers or chills  EYES/ENT: Denies visual changes;  No vertigo or throat pain   NECK: Denies pain or stiffness  RESPIRATORY: Denies cough, wheezing, hemoptysis; No shortness of breath,   CARDIOVASCULAR: Denies chest pain or palpitations  GASTROINTESTINAL: Denies abdominal or epigastric pain. No nausea, vomiting, or hematemesis; No diarrhea or constipation.   GENITOURINARY: Denies dysuria, frequency or hematuria  NEUROLOGICAL: Denies numbness or weakness  SKIN: Denies itching, burning, rashes, or lesions   All other review of systems is negative unless indicated above    PHYSICAL EXAM:    Vital Signs Last 24 Hrs  T(C): 37.1 (09 Mar 2019 11:30), Max: 37.6 (08 Mar 2019 17:06)  T(F): 98.8 (09 Mar 2019 11:30), Max: 99.7 (08 Mar 2019 17:06)  HR: 117 (09 Mar 2019 11:30) (100 - 121)  BP: 123/62 (09 Mar 2019 11:30) (102/58 - 123/62)  BP(mean): --  RR: 18 (09 Mar 2019 11:30) (16 - 18)  SpO2: 93% (09 Mar 2019 11:30) (92% - 96%)    GENERAL: comfortable, NAD   HEAD:  Atraumatic, Normocephalic  EYES: EOMI, conjunctiva and sclera clear  HEENT: Moist mucous membranes  NECK: Supple, No JVD  NERVOUS SYSTEM:  Alert & Oriented X2 - forgetful, Motor Strength 5/5 B/L upper extremities  CHEST/LUNG: Clear to auscultation bilaterally; No rales, rhonchi, wheezing, or rubs  HEART: S1 S/2 normal, no murmur  ABDOMEN: Soft, Nontender, Nondistended; Bowel sounds present  GENITOURINARY: Voiding, nonpalpable bladder  EXTREMITIES:  Pedal edema +3 Right   SKIN: BLE shin lacerations  PSYCH: Mood stable                          9.2    17.24 )-----------( 157      ( 08 Mar 2019 14:44 )             27.8     03-08    138  |  104  |  24<H>  ----------------------------<  131<H>  3.9   |  25  |  0.69    Ca    8.1<L>      08 Mar 2019 14:44    TPro  5.7<L>  /  Alb  2.6<L>  /  TBili  1.3<H>  /  DBili  x   /  AST  35  /  ALT  23  /  AlkPhos  83  07    LIVER FUNCTIONS - ( 07 Mar 2019 19:25 )  Alb: 2.6 g/dL / Pro: 5.7 gm/dL / ALK PHOS: 83 U/L / ALT: 23 U/L / AST: 35 U/L / GGT: x             Culture - Urine (collected 07 Mar 2019 21:56)  Source: .Urine None  Final Report (09 Mar 2019 09:45):    No growth      PT/INR - ( 07 Mar 2019 19:25 )   PT: 14.8 sec;   INR: 1.32 ratio         PTT - ( 07 Mar 2019 19:25 )  PTT:32.0 sec  Urinalysis Basic - ( 07 Mar 2019 21:56 )    Color: Yellow / Appearance: Clear / S.025 / pH: x  Gluc: x / Ketone: Negative  / Bili: Negative / Urobili: 1 mg/dL   Blood: x / Protein: 30 mg/dL / Nitrite: Negative   Leuk Esterase: Negative / RBC: 3-5 /HPF / WBC 0-2   Sq Epi: x / Non Sq Epi: Occasional / Bacteria: Moderate        PT/INR - ( 07 Mar 2019 19:25 )   PT: 14.8 sec;   INR: 1.32 ratio         PTT - ( 07 Mar 2019 19:25 )  PTT:32.0 sec  CAPILLARY BLOOD GLUCOSE      POCT Blood Glucose.: 309 mg/dL (09 Mar 2019 11:17)      Culture - Urine (collected 07 Mar 2019 21:56)  Source: .Urine None  Final Report (09 Mar 2019 09:45):    No growth      MEDICATIONS  (STANDING):  BACItracin   Ointment 1 Application(s) Topical daily  dextrose 5%. 1000 milliLiter(s) (50 mL/Hr) IV Continuous <Continuous>  dextrose 50% Injectable 12.5 Gram(s) IV Push once  dextrose 50% Injectable 25 Gram(s) IV Push once  dextrose 50% Injectable 25 Gram(s) IV Push once  docusate sodium 100 milliGRAM(s) Oral two times a day  heparin  Injectable 5000 Unit(s) SubCutaneous every 8 hours  insulin glargine Injectable (LANTUS) 10 Unit(s) SubCutaneous every morning  insulin lispro (HumaLOG) corrective regimen sliding scale   SubCutaneous three times a day before meals  levothyroxine 175 MICROGram(s) Oral daily  senna 2 Tablet(s) Oral at bedtime    MEDICATIONS  (PRN):  acetaminophen   Tablet .. 650 milliGRAM(s) Oral every 6 hours PRN Temp greater or equal to 38C (100.4F), Mild Pain (1 - 3)  dextrose 40% Gel 15 Gram(s) Oral once PRN Blood Glucose LESS THAN 70 milliGRAM(s)/deciliter  glucagon  Injectable 1 milliGRAM(s) IntraMuscular once PRN Glucose LESS THAN 70 milligrams/deciliter

## 2019-03-09 NOTE — PROGRESS NOTE ADULT - ATTENDING COMMENTS
on exam- comfortable   -rs-aeeb, cta  -p/a-soft, bs+  -cvs-s1s2 normal    #supportive care, pt and discharge plan
on exam- comfortable   -rs-aeeb, cta  -p/a-soft, bs+  -cvs-s1s2 normal    #supportive care, pt and discharge plan

## 2019-03-09 NOTE — CDI QUERY NOTE - NSCDIOTHERTXTBX_GEN_ALL_CORE_HH
Per documentation ED; 89 year old patient  s/p mechanical fall. Pt was walking up the stairs when he felt weak and fell forward.      Pt. required : Laceration repair in ED - RRL - 10 cm - Skin sutures - Bilateral leg lacerations    Pt. has fallen in past six months.     On Fall Precaution    Physical Therapy evaluation:      Gait Analysis:     · Gait Deviations Noted	decreased natalie; decreased step length  · Impairments Contributing to Gait Deviations	impaired balance; decreased strength    Balance Skills Assessment:     · Sitting Balance: Static	normal balance  · Sitting Balance: Dynamic	good minus  · Standing Balance: Static	fair minus  with RW  · Standing Balance: Dynamic	poor balance  with RW  · Systems Impairment Contributing to Balance Disturbance	musculoskeletal  · Identified Impairments Contributing to Balance Disturbance	decreased strength      Please clarify a diagnosis based on the above clinical.  A) Falls due to age related physical debility  B) Mechanical Fall  C) Unknown  D) Other ( Please specify condition)

## 2019-03-10 LAB
GLUCOSE BLDC GLUCOMTR-MCNC: 107 MG/DL — HIGH (ref 70–99)
GLUCOSE BLDC GLUCOMTR-MCNC: 121 MG/DL — HIGH (ref 70–99)
GLUCOSE BLDC GLUCOMTR-MCNC: 148 MG/DL — HIGH (ref 70–99)
GLUCOSE BLDC GLUCOMTR-MCNC: 320 MG/DL — HIGH (ref 70–99)
GLUCOSE BLDC GLUCOMTR-MCNC: 69 MG/DL — LOW (ref 70–99)

## 2019-03-10 RX ADMIN — HEPARIN SODIUM 5000 UNIT(S): 5000 INJECTION INTRAVENOUS; SUBCUTANEOUS at 20:33

## 2019-03-10 RX ADMIN — HEPARIN SODIUM 5000 UNIT(S): 5000 INJECTION INTRAVENOUS; SUBCUTANEOUS at 05:27

## 2019-03-10 RX ADMIN — INSULIN GLARGINE 10 UNIT(S): 100 INJECTION, SOLUTION SUBCUTANEOUS at 09:07

## 2019-03-10 RX ADMIN — Medication 81 MILLIGRAM(S): at 12:09

## 2019-03-10 RX ADMIN — Medication 8: at 12:40

## 2019-03-10 RX ADMIN — Medication 175 MICROGRAM(S): at 05:27

## 2019-03-10 RX ADMIN — HEPARIN SODIUM 5000 UNIT(S): 5000 INJECTION INTRAVENOUS; SUBCUTANEOUS at 14:49

## 2019-03-10 RX ADMIN — Medication 1 APPLICATION(S): at 12:05

## 2019-03-10 RX ADMIN — CEFTRIAXONE 1000 MILLIGRAM(S): 500 INJECTION, POWDER, FOR SOLUTION INTRAMUSCULAR; INTRAVENOUS at 12:05

## 2019-03-10 RX ADMIN — Medication 100 MILLIGRAM(S): at 05:27

## 2019-03-10 NOTE — PROGRESS NOTE ADULT - ASSESSMENT
89 y/o M with PMHx significant for A-Fib (No AC 2/2 Hx falls), hypothyroidism presenting to the ED s/p mechanical fall, (+) lacerations on both legs - sutures places to RLE. Pt denies hitting head, LOC, cough, or SOB.    #S/p Mechanical Fall w/ BLE Leg laceration with suspected cellulitis   -due to advanced age most likely  - Fall precaution  - S/p laceration repair - wound care bacitracin daily   - PT evaluation  -ct abx for now     # Suspected cellulitis of leg   -ct abx and monitor him     #Hypothyroidism  - Cont. levothyroxine  - TSH WNL     #DM2 ( Non-insulin Dependent)   - Cont. ISS  -ct lantus 10 unit for better blood sugar control   -better reading now     #H/o Afib   -better with cardizem- change to CD form and monitor him   -not on AC due to fall     #Leucocytosis - blood culture and monitor it     #DVT Prophylaxis  - SQ Heparin

## 2019-03-10 NOTE — PROGRESS NOTE ADULT - SUBJECTIVE AND OBJECTIVE BOX
HPI:  89 y/o M with a PMHx of DM2 (non-insulin dependent), CAD, A-Fib (No AC 2/2 Hx falls), hypothyroidism presenting to the ED s/p mechanical fall. Pt was walking up the stairs when he felt weak and fell forward. Pt's son notes that pt has been increasingly fatigued, constipated, and weak over the past few weeks. Patient with (+) lacerations on both legs. Pt denies hitting head, LOC, cough, or SOB.          Review of Systems:   CONSTITUTIONAL: Denies weakness, fevers or chills  EYES/ENT: Denies visual changes;  No vertigo or throat pain   NECK: Denies pain or stiffness  RESPIRATORY: Denies cough, wheezing, hemoptysis; No shortness of breath,   CARDIOVASCULAR: Denies chest pain or palpitations  GASTROINTESTINAL: Denies abdominal or epigastric pain. No nausea, vomiting, or hematemesis; No diarrhea or constipation.   GENITOURINARY: Denies dysuria, frequency or hematuria  NEUROLOGICAL: Denies numbness or weakness  SKIN: Denies itching, burning, rashes, or lesions   All other review of systems is negative unless indicated above    PHYSICAL EXAM:    Vital Signs Last 24 Hrs  T(C): 37 (10 Mar 2019 05:00), Max: 37.8 (09 Mar 2019 15:26)  T(F): 98.6 (10 Mar 2019 05:00), Max: 100 (09 Mar 2019 15:26)  HR: 80 (10 Mar 2019 05:00) (80 - 117)  BP: 121/63 (10 Mar 2019 05:00) (106/53 - 123/62)  BP(mean): --  RR: 16 (10 Mar 2019 05:00) (16 - 18)  SpO2: 98% (10 Mar 2019 05:00) (93% - 98%)    GENERAL: comfortable, NAD   HEAD:  Atraumatic, Normocephalic  EYES: EOMI, conjunctiva and sclera clear  HEENT: Moist mucous membranes  NECK: Supple, No JVD  NERVOUS SYSTEM:  Alert & Oriented X2 - forgetful, Motor Strength 5/5 B/L upper extremities  CHEST/LUNG: Clear to auscultation bilaterally; No rales, rhonchi, wheezing, or rubs  HEART: S1 S/2 normal, no murmur  ABDOMEN: Soft, Nontender, Nondistended; Bowel sounds present  GENITOURINARY: Voiding, nonpalpable bladder  EXTREMITIES:  Pedal edema +1 Right   SKIN: BLE shin lacerations, redness noted   PSYCH: Mood stable                          9.7    15.65 )-----------( 167      ( 09 Mar 2019 15:53 )             28.7     03-08    138  |  104  |  24<H>  ----------------------------<  131<H>  3.9   |  25  |  0.69    Ca    8.1<L>      08 Mar 2019 14:44          Culture - Urine (collected 07 Mar 2019 21:56)  Source: .Urine None  Final Report (09 Mar 2019 09:45):    No growth              CAPILLARY BLOOD GLUCOSE      POCT Blood Glucose.: 161 mg/dL (09 Mar 2019 22:11)      Culture - Urine (collected 07 Mar 2019 21:56)  Source: .Urine None  Final Report (09 Mar 2019 09:45):    No growth      MEDICATIONS  (STANDING):  aspirin  chewable 81 milliGRAM(s) Oral daily  BACItracin   Ointment 1 Application(s) Topical daily  cefTRIAXone Injectable.      cefTRIAXone Injectable. 1000 milliGRAM(s) IV Push every 24 hours  dextrose 5%. 1000 milliLiter(s) (50 mL/Hr) IV Continuous <Continuous>  dextrose 50% Injectable 12.5 Gram(s) IV Push once  dextrose 50% Injectable 25 Gram(s) IV Push once  dextrose 50% Injectable 25 Gram(s) IV Push once  diltiazem    Tablet 120 milliGRAM(s) Oral daily  docusate sodium 100 milliGRAM(s) Oral two times a day  heparin  Injectable 5000 Unit(s) SubCutaneous every 8 hours  insulin glargine Injectable (LANTUS) 10 Unit(s) SubCutaneous every morning  insulin lispro (HumaLOG) corrective regimen sliding scale   SubCutaneous three times a day before meals  levothyroxine 175 MICROGram(s) Oral daily  senna 2 Tablet(s) Oral at bedtime    MEDICATIONS  (PRN):  acetaminophen   Tablet .. 650 milliGRAM(s) Oral every 6 hours PRN Temp greater or equal to 38C (100.4F), Mild Pain (1 - 3)  dextrose 40% Gel 15 Gram(s) Oral once PRN Blood Glucose LESS THAN 70 milliGRAM(s)/deciliter  glucagon  Injectable 1 milliGRAM(s) IntraMuscular once PRN Glucose LESS THAN 70 milligrams/deciliter

## 2019-03-11 ENCOUNTER — TRANSCRIPTION ENCOUNTER (OUTPATIENT)
Age: 84
End: 2019-03-11

## 2019-03-11 VITALS
HEART RATE: 76 BPM | RESPIRATION RATE: 20 BRPM | SYSTOLIC BLOOD PRESSURE: 134 MMHG | OXYGEN SATURATION: 95 % | TEMPERATURE: 98 F | DIASTOLIC BLOOD PRESSURE: 61 MMHG

## 2019-03-11 LAB
ANION GAP SERPL CALC-SCNC: 6 MMOL/L — SIGNIFICANT CHANGE UP (ref 5–17)
BUN SERPL-MCNC: 23 MG/DL — SIGNIFICANT CHANGE UP (ref 7–23)
CALCIUM SERPL-MCNC: 7.7 MG/DL — LOW (ref 8.5–10.1)
CHLORIDE SERPL-SCNC: 100 MMOL/L — SIGNIFICANT CHANGE UP (ref 96–108)
CO2 SERPL-SCNC: 27 MMOL/L — SIGNIFICANT CHANGE UP (ref 22–31)
CREAT SERPL-MCNC: 0.66 MG/DL — SIGNIFICANT CHANGE UP (ref 0.5–1.3)
GLUCOSE BLDC GLUCOMTR-MCNC: 142 MG/DL — HIGH (ref 70–99)
GLUCOSE BLDC GLUCOMTR-MCNC: 233 MG/DL — HIGH (ref 70–99)
GLUCOSE BLDC GLUCOMTR-MCNC: 96 MG/DL — SIGNIFICANT CHANGE UP (ref 70–99)
GLUCOSE SERPL-MCNC: 131 MG/DL — HIGH (ref 70–99)
HCT VFR BLD CALC: 29.1 % — LOW (ref 39–50)
HGB BLD-MCNC: 9.8 G/DL — LOW (ref 13–17)
MCHC RBC-ENTMCNC: 29.5 PG — SIGNIFICANT CHANGE UP (ref 27–34)
MCHC RBC-ENTMCNC: 33.7 GM/DL — SIGNIFICANT CHANGE UP (ref 32–36)
MCV RBC AUTO: 87.7 FL — SIGNIFICANT CHANGE UP (ref 80–100)
NRBC # BLD: 0 /100 WBCS — SIGNIFICANT CHANGE UP (ref 0–0)
PLATELET # BLD AUTO: 193 K/UL — SIGNIFICANT CHANGE UP (ref 150–400)
POTASSIUM SERPL-MCNC: 3.6 MMOL/L — SIGNIFICANT CHANGE UP (ref 3.5–5.3)
POTASSIUM SERPL-SCNC: 3.6 MMOL/L — SIGNIFICANT CHANGE UP (ref 3.5–5.3)
RBC # BLD: 3.32 M/UL — LOW (ref 4.2–5.8)
RBC # FLD: 13.1 % — SIGNIFICANT CHANGE UP (ref 10.3–14.5)
SODIUM SERPL-SCNC: 133 MMOL/L — LOW (ref 135–145)
WBC # BLD: 17.95 K/UL — HIGH (ref 3.8–10.5)
WBC # FLD AUTO: 17.95 K/UL — HIGH (ref 3.8–10.5)

## 2019-03-11 RX ORDER — DILTIAZEM HCL 120 MG
120 CAPSULE, EXT RELEASE 24 HR ORAL DAILY
Qty: 0 | Refills: 0 | Status: DISCONTINUED | OUTPATIENT
Start: 2019-03-12 | End: 2019-03-11

## 2019-03-11 RX ORDER — SENNA PLUS 8.6 MG/1
2 TABLET ORAL
Qty: 0 | Refills: 0 | DISCHARGE
Start: 2019-03-11

## 2019-03-11 RX ORDER — IPRATROPIUM/ALBUTEROL SULFATE 18-103MCG
3 AEROSOL WITH ADAPTER (GRAM) INHALATION ONCE
Qty: 0 | Refills: 0 | Status: COMPLETED | OUTPATIENT
Start: 2019-03-11 | End: 2019-03-11

## 2019-03-11 RX ORDER — DILTIAZEM HCL 120 MG
1 CAPSULE, EXT RELEASE 24 HR ORAL
Qty: 0 | Refills: 0 | DISCHARGE
Start: 2019-03-11

## 2019-03-11 RX ORDER — BACITRACIN ZINC 500 UNIT/G
1 OINTMENT IN PACKET (EA) TOPICAL
Qty: 0 | Refills: 0 | DISCHARGE
Start: 2019-03-11

## 2019-03-11 RX ADMIN — Medication 81 MILLIGRAM(S): at 12:09

## 2019-03-11 RX ADMIN — Medication 100 MILLIGRAM(S): at 05:35

## 2019-03-11 RX ADMIN — Medication 3 MILLILITER(S): at 02:28

## 2019-03-11 RX ADMIN — HEPARIN SODIUM 5000 UNIT(S): 5000 INJECTION INTRAVENOUS; SUBCUTANEOUS at 14:56

## 2019-03-11 RX ADMIN — SENNA PLUS 2 TABLET(S): 8.6 TABLET ORAL at 01:33

## 2019-03-11 RX ADMIN — Medication 1 APPLICATION(S): at 12:09

## 2019-03-11 RX ADMIN — Medication 175 MICROGRAM(S): at 05:34

## 2019-03-11 RX ADMIN — HEPARIN SODIUM 5000 UNIT(S): 5000 INJECTION INTRAVENOUS; SUBCUTANEOUS at 05:34

## 2019-03-11 RX ADMIN — INSULIN GLARGINE 10 UNIT(S): 100 INJECTION, SOLUTION SUBCUTANEOUS at 08:50

## 2019-03-11 RX ADMIN — CEFTRIAXONE 1000 MILLIGRAM(S): 500 INJECTION, POWDER, FOR SOLUTION INTRAMUSCULAR; INTRAVENOUS at 12:09

## 2019-03-11 RX ADMIN — Medication 4: at 12:09

## 2019-03-11 NOTE — DISCHARGE NOTE ADULT - HOSPITAL COURSE
89 y/o M with a PMHx of DM2 (non-insulin dependent), CAD, A-Fib (No AC 2/2 Hx falls), hypothyroidism presenting to the ED s/p mechanical fall. Pt was walking up the stairs when he felt weak and fell forward. Pt's son notes that pt has been increasingly fatigued, constipated, and weak over the past few weeks. Patient with (+) lacerations on both legs. Pt denies hitting head, LOC, cough, or SOB.      Review of Systems:   CONSTITUTIONAL: Denies weakness, fevers or chills  EYES/ENT: Denies visual changes;  No vertigo or throat pain   NECK: Denies pain or stiffness  RESPIRATORY: Denies cough, wheezing, hemoptysis; No shortness of breath,   CARDIOVASCULAR: Denies chest pain or palpitations  GASTROINTESTINAL: Denies abdominal or epigastric pain. No nausea, vomiting, or hematemesis; No diarrhea or constipation.   GENITOURINARY: Denies dysuria, frequency or hematuria  NEUROLOGICAL: Denies numbness or weakness  SKIN: Denies itching, burning, rashes, or lesions   All other review of systems is negative unless indicated above    PHYSICAL EXAM:    Vital Signs Last 24 Hrs  T(C): 36.9 (11 Mar 2019 12:36), Max: 37.1 (10 Mar 2019 20:55)  T(F): 98.5 (11 Mar 2019 12:36), Max: 98.7 (10 Mar 2019 20:55)  HR: 76 (11 Mar 2019 12:36) (76 - 95)  BP: 134/61 (11 Mar 2019 12:36) (125/63 - 134/61)  BP(mean): --  RR: 20 (11 Mar 2019 12:36) (17 - 20)  SpO2: 95% (11 Mar 2019 12:36) (94% - 98%)    GENERAL: comfortable, NAD   HEAD:  Atraumatic, Normocephalic  EYES: EOMI, conjunctiva and sclera clear  HEENT: Moist mucous membranes  NECK: Supple, No JVD  NERVOUS SYSTEM:  Alert & Oriented X2 - forgetful, Motor Strength 5/5 B/L upper extremities- age appropriate   CHEST/LUNG: Clear to auscultation bilaterally; No rales, rhonchi, wheezing, or rubs  HEART: S1 S/2 normal, no murmur  ABDOMEN: Soft, Nontender, Nondistended; Bowel sounds present  GENITOURINARY: Voiding, nonpalpable bladder  EXTREMITIES:  Pedal edema +1 Right   SKIN: BLE shin lacerations, redness noted -does not appears infected   PSYCH: Mood stable    A/P      #S/p Mechanical Fall w/ BLE Leg laceration with suspected cellulitis   -at this point it dose not look infected or cellulitis due to infection. monitoring of it and local care around that area     # Suspected cellulitis of leg   -not sure if it was infected or just redness due to noninfectious inflammation due to trauma. Will hold off on further abx     #Leucocytosis - most likely chronically. culture remains negative, no sign of any infection. Most likely CLL or some other disorder, out pt management advised       #Hypothyroidism  - Cont. levothyroxine      #DM2 ( Non-insulin Dependent)   -resume his home meds     #H/o Afib   -rate under control     #d/c today, time spent 60 minutes

## 2019-03-11 NOTE — DISCHARGE NOTE ADULT - PATIENT PORTAL LINK FT
You can access the [email or web address], offered by [facility], by registering with the following website: [email or web address]

## 2019-03-11 NOTE — DISCHARGE NOTE ADULT - MEDICATION SUMMARY - MEDICATIONS TO TAKE
I will START or STAY ON the medications listed below when I get home from the hospital:    dilTIAZem 120 mg/24 hours oral capsule, extended release  -- 1 cap(s) by mouth once a day  -- Indication: For Afib    glipiZIDE 5 mg oral tablet  -- 1 tab(s) by mouth once a day  -- Indication: For dm    bacitracin 500 units/g topical ointment  -- 1 application on skin once a day  -- Indication: For Wound care    senna oral tablet  -- 2 tab(s) by mouth once a day (at bedtime)  -- Indication: For bowel care    Synthroid 175 mcg (0.175 mg) oral tablet  -- 1 tab(s) by mouth once a day  -- Indication: For Home med

## 2019-03-15 LAB
CULTURE RESULTS: SIGNIFICANT CHANGE UP
SPECIMEN SOURCE: SIGNIFICANT CHANGE UP

## 2019-07-03 ENCOUNTER — INPATIENT (INPATIENT)
Facility: HOSPITAL | Age: 84
LOS: 2 days | Discharge: NURSING, REHAB OR LONG-TERM CARE | DRG: 640 | End: 2019-07-06
Attending: INTERNAL MEDICINE | Admitting: INTERNAL MEDICINE
Payer: MEDICARE

## 2019-07-03 VITALS
RESPIRATION RATE: 18 BRPM | HEIGHT: 66 IN | HEART RATE: 52 BPM | OXYGEN SATURATION: 94 % | SYSTOLIC BLOOD PRESSURE: 134 MMHG | DIASTOLIC BLOOD PRESSURE: 81 MMHG | WEIGHT: 160.06 LBS

## 2019-07-03 DIAGNOSIS — I26.99 OTHER PULMONARY EMBOLISM WITHOUT ACUTE COR PULMONALE: ICD-10-CM

## 2019-07-03 DIAGNOSIS — R62.7 ADULT FAILURE TO THRIVE: ICD-10-CM

## 2019-07-03 PROBLEM — E03.9 HYPOTHYROIDISM, UNSPECIFIED: Chronic | Status: ACTIVE | Noted: 2019-03-08

## 2019-07-03 PROBLEM — I48.91 UNSPECIFIED ATRIAL FIBRILLATION: Chronic | Status: ACTIVE | Noted: 2019-03-08

## 2019-07-03 PROBLEM — I25.10 ATHEROSCLEROTIC HEART DISEASE OF NATIVE CORONARY ARTERY WITHOUT ANGINA PECTORIS: Chronic | Status: ACTIVE | Noted: 2019-03-08

## 2019-07-03 LAB
ALBUMIN SERPL ELPH-MCNC: 2.4 G/DL — LOW (ref 3.3–5)
ALP SERPL-CCNC: 98 U/L — SIGNIFICANT CHANGE UP (ref 40–120)
ALT FLD-CCNC: 9 U/L — LOW (ref 10–45)
ANION GAP SERPL CALC-SCNC: 10 MMOL/L — SIGNIFICANT CHANGE UP (ref 5–17)
APPEARANCE UR: CLEAR — SIGNIFICANT CHANGE UP
APTT BLD: 28.9 SEC — SIGNIFICANT CHANGE UP (ref 27.5–36.3)
AST SERPL-CCNC: 14 U/L — SIGNIFICANT CHANGE UP (ref 10–40)
BACTERIA # UR AUTO: NEGATIVE — SIGNIFICANT CHANGE UP
BASOPHILS # BLD AUTO: 0 K/UL — SIGNIFICANT CHANGE UP (ref 0–0.2)
BASOPHILS NFR BLD AUTO: 0 % — SIGNIFICANT CHANGE UP (ref 0–2)
BILIRUB SERPL-MCNC: 0.5 MG/DL — SIGNIFICANT CHANGE UP (ref 0.2–1.2)
BILIRUB UR-MCNC: NEGATIVE — SIGNIFICANT CHANGE UP
BUN SERPL-MCNC: 18 MG/DL — SIGNIFICANT CHANGE UP (ref 7–23)
BURR CELLS BLD QL SMEAR: PRESENT — SIGNIFICANT CHANGE UP
CALCIUM SERPL-MCNC: 8.4 MG/DL — SIGNIFICANT CHANGE UP (ref 8.4–10.5)
CHLORIDE SERPL-SCNC: 96 MMOL/L — SIGNIFICANT CHANGE UP (ref 96–108)
CO2 SERPL-SCNC: 28 MMOL/L — SIGNIFICANT CHANGE UP (ref 22–31)
COLOR SPEC: ABNORMAL
CREAT SERPL-MCNC: 0.8 MG/DL — SIGNIFICANT CHANGE UP (ref 0.5–1.3)
DIFF PNL FLD: ABNORMAL
EOSINOPHIL # BLD AUTO: 0 K/UL — SIGNIFICANT CHANGE UP (ref 0–0.5)
EOSINOPHIL NFR BLD AUTO: 0 % — SIGNIFICANT CHANGE UP (ref 0–6)
EPI CELLS # UR: 1 /HPF — SIGNIFICANT CHANGE UP
GLUCOSE BLDC GLUCOMTR-MCNC: 148 MG/DL — HIGH (ref 70–99)
GLUCOSE SERPL-MCNC: 208 MG/DL — HIGH (ref 70–99)
GLUCOSE UR QL: ABNORMAL
HCT VFR BLD CALC: 37.9 % — LOW (ref 39–50)
HGB BLD-MCNC: 12.3 G/DL — LOW (ref 13–17)
HYALINE CASTS # UR AUTO: 0 /LPF — SIGNIFICANT CHANGE UP (ref 0–2)
INR BLD: 1.12 RATIO — SIGNIFICANT CHANGE UP (ref 0.88–1.16)
KETONES UR-MCNC: NEGATIVE — SIGNIFICANT CHANGE UP
LEUKOCYTE ESTERASE UR-ACNC: NEGATIVE — SIGNIFICANT CHANGE UP
LYMPHOCYTES # BLD AUTO: 1.2 K/UL — SIGNIFICANT CHANGE UP (ref 1–3.3)
LYMPHOCYTES # BLD AUTO: 7 % — LOW (ref 13–44)
MCHC RBC-ENTMCNC: 28.7 PG — SIGNIFICANT CHANGE UP (ref 27–34)
MCHC RBC-ENTMCNC: 32.6 GM/DL — SIGNIFICANT CHANGE UP (ref 32–36)
MCV RBC AUTO: 88.2 FL — SIGNIFICANT CHANGE UP (ref 80–100)
MONOCYTES # BLD AUTO: 0.7 K/UL — SIGNIFICANT CHANGE UP (ref 0–0.9)
MONOCYTES NFR BLD AUTO: 3 % — SIGNIFICANT CHANGE UP (ref 2–14)
NEUTROPHILS # BLD AUTO: 17.2 K/UL — HIGH (ref 1.8–7.4)
NEUTROPHILS NFR BLD AUTO: 89 % — HIGH (ref 43–77)
NEUTS BAND # BLD: 1 % — SIGNIFICANT CHANGE UP (ref 0–8)
NITRITE UR-MCNC: NEGATIVE — SIGNIFICANT CHANGE UP
PH UR: 5.5 — SIGNIFICANT CHANGE UP (ref 5–8)
PLAT MORPH BLD: NORMAL — SIGNIFICANT CHANGE UP
PLATELET # BLD AUTO: 199 K/UL — SIGNIFICANT CHANGE UP (ref 150–400)
POIKILOCYTOSIS BLD QL AUTO: SLIGHT — SIGNIFICANT CHANGE UP
POTASSIUM SERPL-MCNC: 4.8 MMOL/L — SIGNIFICANT CHANGE UP (ref 3.5–5.3)
POTASSIUM SERPL-SCNC: 4.8 MMOL/L — SIGNIFICANT CHANGE UP (ref 3.5–5.3)
PROT SERPL-MCNC: 4.8 G/DL — LOW (ref 6–8.3)
PROT UR-MCNC: ABNORMAL
PROTHROM AB SERPL-ACNC: 12.8 SEC — SIGNIFICANT CHANGE UP (ref 10–12.9)
RBC # BLD: 4.29 M/UL — SIGNIFICANT CHANGE UP (ref 4.2–5.8)
RBC # FLD: 14.6 % — HIGH (ref 10.3–14.5)
RBC BLD AUTO: SIGNIFICANT CHANGE UP
RBC CASTS # UR COMP ASSIST: 71 /HPF — HIGH (ref 0–4)
SCHISTOCYTES BLD QL AUTO: SLIGHT — SIGNIFICANT CHANGE UP
SODIUM SERPL-SCNC: 134 MMOL/L — LOW (ref 135–145)
SP GR SPEC: 1.03 — HIGH (ref 1.01–1.02)
UROBILINOGEN FLD QL: NEGATIVE — SIGNIFICANT CHANGE UP
WBC # BLD: 19.2 K/UL — HIGH (ref 3.8–10.5)
WBC # FLD AUTO: 19.2 K/UL — HIGH (ref 3.8–10.5)
WBC UR QL: 1 /HPF — SIGNIFICANT CHANGE UP (ref 0–5)

## 2019-07-03 PROCEDURE — 74018 RADEX ABDOMEN 1 VIEW: CPT | Mod: 26

## 2019-07-03 PROCEDURE — 71045 X-RAY EXAM CHEST 1 VIEW: CPT | Mod: 26

## 2019-07-03 PROCEDURE — 99285 EMERGENCY DEPT VISIT HI MDM: CPT

## 2019-07-03 PROCEDURE — 93010 ELECTROCARDIOGRAM REPORT: CPT

## 2019-07-03 RX ORDER — HEPARIN SODIUM 5000 [USP'U]/ML
5000 INJECTION INTRAVENOUS; SUBCUTANEOUS EVERY 12 HOURS
Refills: 0 | Status: DISCONTINUED | OUTPATIENT
Start: 2019-07-03 | End: 2019-07-06

## 2019-07-03 RX ORDER — DILTIAZEM HCL 120 MG
60 CAPSULE, EXT RELEASE 24 HR ORAL THREE TIMES A DAY
Refills: 0 | Status: DISCONTINUED | OUTPATIENT
Start: 2019-07-03 | End: 2019-07-06

## 2019-07-03 RX ORDER — CEFTRIAXONE 500 MG/1
1000 INJECTION, POWDER, FOR SOLUTION INTRAMUSCULAR; INTRAVENOUS EVERY 24 HOURS
Refills: 0 | Status: DISCONTINUED | OUTPATIENT
Start: 2019-07-03 | End: 2019-07-04

## 2019-07-03 RX ORDER — DEXTROSE 50 % IN WATER 50 %
15 SYRINGE (ML) INTRAVENOUS ONCE
Refills: 0 | Status: DISCONTINUED | OUTPATIENT
Start: 2019-07-03 | End: 2019-07-06

## 2019-07-03 RX ORDER — FUROSEMIDE 40 MG
40 TABLET ORAL ONCE
Refills: 0 | Status: COMPLETED | OUTPATIENT
Start: 2019-07-03 | End: 2019-07-03

## 2019-07-03 RX ORDER — SODIUM CHLORIDE 9 MG/ML
1000 INJECTION, SOLUTION INTRAVENOUS
Refills: 0 | Status: DISCONTINUED | OUTPATIENT
Start: 2019-07-03 | End: 2019-07-06

## 2019-07-03 RX ORDER — SODIUM CHLORIDE 9 MG/ML
1000 INJECTION INTRAMUSCULAR; INTRAVENOUS; SUBCUTANEOUS
Refills: 0 | Status: DISCONTINUED | OUTPATIENT
Start: 2019-07-03 | End: 2019-07-03

## 2019-07-03 RX ORDER — SODIUM CHLORIDE 9 MG/ML
500 INJECTION INTRAMUSCULAR; INTRAVENOUS; SUBCUTANEOUS ONCE
Refills: 0 | Status: COMPLETED | OUTPATIENT
Start: 2019-07-03 | End: 2019-07-03

## 2019-07-03 RX ORDER — FUROSEMIDE 40 MG
20 TABLET ORAL DAILY
Refills: 0 | Status: DISCONTINUED | OUTPATIENT
Start: 2019-07-04 | End: 2019-07-05

## 2019-07-03 RX ORDER — DEXTROSE 50 % IN WATER 50 %
12.5 SYRINGE (ML) INTRAVENOUS ONCE
Refills: 0 | Status: DISCONTINUED | OUTPATIENT
Start: 2019-07-03 | End: 2019-07-06

## 2019-07-03 RX ORDER — INSULIN LISPRO 100/ML
VIAL (ML) SUBCUTANEOUS
Refills: 0 | Status: DISCONTINUED | OUTPATIENT
Start: 2019-07-03 | End: 2019-07-06

## 2019-07-03 RX ORDER — GLUCAGON INJECTION, SOLUTION 0.5 MG/.1ML
1 INJECTION, SOLUTION SUBCUTANEOUS ONCE
Refills: 0 | Status: DISCONTINUED | OUTPATIENT
Start: 2019-07-03 | End: 2019-07-06

## 2019-07-03 RX ORDER — INSULIN GLARGINE 100 [IU]/ML
6 INJECTION, SOLUTION SUBCUTANEOUS AT BEDTIME
Refills: 0 | Status: DISCONTINUED | OUTPATIENT
Start: 2019-07-03 | End: 2019-07-04

## 2019-07-03 RX ORDER — DEXTROSE 50 % IN WATER 50 %
25 SYRINGE (ML) INTRAVENOUS ONCE
Refills: 0 | Status: DISCONTINUED | OUTPATIENT
Start: 2019-07-03 | End: 2019-07-06

## 2019-07-03 RX ADMIN — Medication 40 MILLIGRAM(S): at 18:27

## 2019-07-03 RX ADMIN — SODIUM CHLORIDE 500 MILLILITER(S): 9 INJECTION INTRAMUSCULAR; INTRAVENOUS; SUBCUTANEOUS at 16:21

## 2019-07-03 RX ADMIN — SODIUM CHLORIDE 60 MILLILITER(S): 9 INJECTION INTRAMUSCULAR; INTRAVENOUS; SUBCUTANEOUS at 17:00

## 2019-07-03 RX ADMIN — INSULIN GLARGINE 6 UNIT(S): 100 INJECTION, SOLUTION SUBCUTANEOUS at 22:49

## 2019-07-03 RX ADMIN — Medication 60 MILLIGRAM(S): at 22:49

## 2019-07-03 RX ADMIN — Medication 2: at 16:54

## 2019-07-03 RX ADMIN — CEFTRIAXONE 100 MILLIGRAM(S): 500 INJECTION, POWDER, FOR SOLUTION INTRAMUSCULAR; INTRAVENOUS at 17:29

## 2019-07-03 RX ADMIN — HEPARIN SODIUM 5000 UNIT(S): 5000 INJECTION INTRAVENOUS; SUBCUTANEOUS at 18:27

## 2019-07-03 NOTE — CONSULT NOTE ADULT - SUBJECTIVE AND OBJECTIVE BOX
HPI:   Patient is a 89y male with admission from home with poor po intake and failure to thrive.  He was hospitalized at [facility] and discharged .He was there a week,treated for diarrheal illness felt secondary to giardia,He had hypoxia felt secondary to CHF, history of PAF and MR.He had a CT scan with a cecal mass, clinical concern was of lymphoma.He also had a leukocytosis, wbc between 15-20,000.Palliative care was asked to see him, I am not sure what final disposition was.His wife is at bedside, she is unable to answer any medical questions.He is able to answer simple questions, he is not sure why he is here, denies any abdominal pain, dyspnea or dysuria.    REVIEW OF SYSTEMS:  All other review of systems negative (Comprehensive ROS): limited, poor historian.    PAST MEDICAL & SURGICAL HISTORY:  A-fib  CAD (coronary artery disease)  Hypothyroidism  Diabetes  HLD (hyperlipidemia)  HTN (hypertension)  No significant past surgical history  No significant past surgical history  Giardia  Cecal mass    Allergies    vancomycin (Unknown)    Intolerances        Antimicrobials Day #  :say 1  cefTRIAXone   IVPB 1000 milliGRAM(s) IV Intermittent every 24 hours    Other Medications:  dextrose 40% Gel 15 Gram(s) Oral once PRN  dextrose 5%. 1000 milliLiter(s) IV Continuous <Continuous>  dextrose 50% Injectable 12.5 Gram(s) IV Push once  dextrose 50% Injectable 25 Gram(s) IV Push once  dextrose 50% Injectable 25 Gram(s) IV Push once  diltiazem    Tablet 60 milliGRAM(s) Oral three times a day  glucagon  Injectable 1 milliGRAM(s) IntraMuscular once PRN  heparin  Injectable 5000 Unit(s) SubCutaneous every 12 hours  insulin glargine Injectable (LANTUS) 6 Unit(s) SubCutaneous at bedtime  insulin lispro (HumaLOG) corrective regimen sliding scale   SubCutaneous three times a day before meals      FAMILY HISTORY:  No pertinent family history in first degree relatives      SOCIAL HISTORY:  Smoking: denies     ETOH: denies    Drug Use: denies         T(F): 98.1 (19 @ 18:28), Max: 98.8 (19 @ 16:21)  HR: 99 (19 @ 18:28)  BP: 120/80 (19 @ 18:28)  RR: 18 (19 @ 18:28)  SpO2: 99% (19 @ 18:28)  Wt(kg): --    PHYSICAL EXAM:  General: alert, no acute distress, frail and cahectic  Eyes:  anicteric, no conjunctival injection, no discharge  Oropharynx: no lesions or injection 	  Neck: supple, without adenopathy  Lungs: clear to auscultation  Heart: irregular rate and rhythm; soft supriya  Abdomen: soft, nondistended, nontender, without mass or organomegaly  Skin: no lesions  Extremities: no clubbing, cyanosis, + edema  Neurologic: alert, oriented, moves all extremities    LAB RESULTS:                        12.3   19.2  )-----------( 199      ( 2019 16:26 )             37.9     07-03    134<L>  |  96  |  18  ----------------------------<  208<H>  4.8   |  28  |  0.80    Ca    8.4      2019 16:26    TPro  4.8<L>  /  Alb  2.4<L>  /  TBili  0.5  /  DBili  0.2  /  AST  14  /  ALT  9<L>  /  AlkPhos  98  07-03    LIVER FUNCTIONS - ( 2019 16:26 )  Alb: 2.4 g/dL / Pro: 4.8 g/dL / ALK PHOS: 98 U/L / ALT: 9 U/L / AST: 14 U/L / GGT: x           Urinalysis Basic - ( 2019 16:26 )    Color: Orange / Appearance: Clear / S.030 / pH: x  Gluc: x / Ketone: Negative  / Bili: Negative / Urobili: Negative   Blood: x / Protein: Trace / Nitrite: Negative   Leuk Esterase: Negative / RBC: 71 /hpf / WBC 1 /HPF   Sq Epi: x / Non Sq Epi: 1 /hpf / Bacteria: Negative        MICROBIOLOGY:  RECENT CULTURES:        RADIOLOGY REVIEWED:

## 2019-07-03 NOTE — CONSULT NOTE ADULT - ASSESSMENT
pt with hx of htn ,chf, diastolic dysfunction, aortic stenosis on exam with failure to thrive and severe le edema and hx of PE.  le edema ?dvt i am not sure if pt has ivc filter  diurese slowly  a.fin follow hr if increase will add metoprolol er 25 mg daily  conservative management

## 2019-07-03 NOTE — ED PROVIDER NOTE - PROGRESS NOTE DETAILS
Jhonatan Jackson MD: Dr. Sejal Upton on board with the patient's case, and starting to place orders for admission. discussed w/ patient and family including wife and daughter in law. pt is DNR, and they would not like aggressive or invasive measures. declined CT scan of head and neck for recent fall yesterday. declined CTA of chest to evaluate for progression of PE as cause of hypoxia. declined anticoagulation aside from aspirin given fall risk despite known bilateral PE (diagnosed in [place]). pt was seen by Hospice RN, and she discussed w/ them about taking pt off the hospice care list at this time for the admission. Jhonatan Jackson MD: Dr. Sejal Upton on board with the patient's case prior to me seeing the patient, and starting to place orders for admission. discussed w/ patient and family including wife and daughter in law. pt is DNR, and they would not like aggressive or invasive measures. declined CT scan of head and neck despite recent fall yesterday; reports that he has no change in baseline mental status. declined CTA of chest to evaluate for progression of PE as cause of hypoxia. declined anticoagulation aside from aspirin given fall risk despite known bilateral PE (diagnosed in [place]). pt was seen by Hospice RN, and she discussed w/ them about taking pt off the hospice care list at this time for the admission.

## 2019-07-03 NOTE — H&P ADULT - NSHPPHYSICALEXAM_GEN_ALL_CORE
PHYSICAL EXAMINATION:  Vital Signs Last 24 Hrs  T(C): --  T(F): --  HR: 52 (03 Jul 2019 15:24) (52 - 52)  BP: 134/81 (03 Jul 2019 15:24) (134/81 - 134/81)  BP(mean): --  RR: 18 (03 Jul 2019 15:24) (18 - 18)  SpO2: 94% (03 Jul 2019 15:24) (94% - 94%)  CAPILLARY BLOOD GLUCOSE            GENERAL: NAD, well-groomed,  HEAD:  atraumatic, normocephalic  EYES: sclera anicteric  ENMT: mucous membranes moist  NECK: supple, No JVD  CHEST/LUNG: clear to auscultation bilaterally;    no      rales   ,   no rhonchi,   HEART: normal S1, S2  ABDOMEN: BS+, soft, ND, NT   EXTREMITIES:    no    edema    b/l LEs  NEURO:  dmentia  SKIN: no     rash PHYSICAL EXAMINATION:  Vital Signs Last 24 Hrs  T(C): --  T(F): --  HR: 52 (03 Jul 2019 15:24) (52 - 52)  BP: 134/81 (03 Jul 2019 15:24) (134/81 - 134/81)  BP(mean): --  RR: 18 (03 Jul 2019 15:24) (18 - 18)  SpO2: 94% (03 Jul 2019 15:24) (94% - 94%)  CAPILLARY BLOOD GLUCOSE            GENERAL: NAD, well-groomed,  HEAD:  atraumatic, normocephalic  EYES: sclera anicteric  ENMT: mucous membranes moist  NECK: supple, No JVD  CHEST/LUNG: clear to auscultation bilaterally;    no      rales   ,   no rhonchi,   HEART: normal S1, S2  ABDOMEN: BS+, soft, ND, NT   EXTREMITIES:   pitting   edema    b/l LEs  NEURO:  dementia  SKIN:  mlple   skin abrasions/  arms  and legs

## 2019-07-03 NOTE — ED ADULT NURSE NOTE - OBJECTIVE STATEMENT
89 y/o male with PMH of dementia, frequent falls, afib not on ac because of frequent falls (only on asa), CHF who presents to the ED from assisted living for FTT. patient with recent admissions to [facility], just discharged 48 hours ago. as per documentation, reportedly had PE and pna, returned to the facility and is not eating or drinking. patient is AAOx1- baseline as per family, disorientated to time, place. PERRL. speech is clear. moving all extremities with equal strength and sensation. lacerations noted to arms and left knee. stage 2 pressure ulcer noted to sacrum. swelling noted to bilateral lower extremities, +2 pitting edema. radial and dorsal pulses noted bilaterally. patient lung sounds CTA bilaterally. cap refill <3sec. patient denies chest pain, SOB, fevers, chills, N/v/D, bloody stools, dysuria, back pain, dizziness, HA, numbness or tingling. straight catheter procedure performed. sterile techique maintained. 300 cc tea color urine noted with sediment. VSS. MD at the bedside. family at the bedside. will continue to monitor.

## 2019-07-03 NOTE — H&P ADULT - ASSESSMENT
89 y/o M with     PMHx significant for A-Fib (No AC 2/2 Hx falls),  hypothyroidism   presenting to the ED   with  FTT  apparently pt  has  been in different  hospitals     DM2 ,  follow  fs   on lantus   H/o Afib, on cardizem   -not on AC due to fall   DVT Prophylaxis  - SQ Heparin    labs/  cxr  pending   hospice  would  be  ideal 89 y/o M with     PMHx significant for A-Fib (No AC 2/2 Hx falls),  hypothyroidism,  anemia, dementia   presenting to the ED   with  FTT  apparently pt  has  been in different  hospitals     DM2 ,  follow  fs      H/o Afib, on cardizem   -not on AC due to fall   DVT Prophylaxis  - SQ Heparin    labs/  cxr  pending  pleasure  feeds/  iv fluids   hospice  would  be  ideal 89 y/o M with     PMHx significant for A-Fib (No AC 2/2 Hx falls),  hypothyroidism,  anemia, dementia   presenting to the ED   with  FTT  apparently pt  has  been in different  hospitals     DM2 ,  follow  fs      H/o Afib, on cardizem   -not on AC due to fall   DVT Prophylaxis  - SQ Heparin      wbc of 18,000/  afberile/    cxr  pending  on  rocephin    ID eval  pleasure  feeds/  iv fluids   hospice  would  be  ideal 89 y/o M with     PMHx significant for A-Fib (No AC 2/2 Hx falls),  hypothyroidism,  anemia, dementia   presenting to the ED   with  FTT  apparently pt  has  been in different  hospitals/  wife at bedside  not  a good  historian either     DM2 ,  follow  fs/ on lantus      H/o Afib, on cardizem   -not on AC due to fall   DVT Prophylaxis  - SQ Heparin      wbc of 18,000/  afebrile    cxr  pending  on  rocephin    ID eval  oral  feeds/  iv fluids   pedal  edema/  on iv lasix/  would  not  pursue leg dopplers , as  pt has recent h/o PE and   has been deemed,  not  a candidate for a/c,  due to mlple falls 89 y/o M with     PMHx significant for A-Fib (No AC 2/2 Hx falls),  hypothyroidism,  anemia, dementia   presenting to the ED   with  FTT  apparently pt  has  been in different  hospitals/  wife at bedside  not  a good  historian either     DM2 ,  follow  fs/ on lantus      H/o Afib, on cardizem   -not on AC due to fall   DVT Prophylaxis  - SQ Heparin      wbc of 18,000/  afebrile    cxr  pending  on  rocephin    ID eval  oral  feeds/  iv fluids   pedal  edema/  on iv lasix/   leg dopplers ,/   pt has recent h/o PE and   has been deemed,  not  a candidate for a/c,  due to mlple falls/  not sure if pt has filter

## 2019-07-03 NOTE — CONSULT NOTE ADULT - SUBJECTIVE AND OBJECTIVE BOX
CHIEF COMPLAINT:Patient is a 89y old  Male who presents with a chief complaint of FTT (03 Jul 2019 15:47)      HPI:  89 y/o M with  dementia/ poor  historian of  DM, CAD, A-Fib,  dementia,  falls/  PE ,   not  on a/c, mlple  falls, hypothyroidism  sent to  er by  n home  for  FTT just  d/c  from  [facility]  48 hrs  ago.  pt denies of any chest pain, sob.  pt can not be anticoagulated sec to bleeding.? ivc filter        PAST MEDICAL & SURGICAL HISTORY:  A-fib  CAD (coronary artery disease)  Hypothyroidism  Diabetes  HLD (hyperlipidemia)  HTN (hypertension)  No significant past surgical history  No significant past surgical history      MEDICATIONS  (STANDING):  cefTRIAXone   IVPB 1000 milliGRAM(s) IV Intermittent every 24 hours  dextrose 5%. 1000 milliLiter(s) (50 mL/Hr) IV Continuous <Continuous>  dextrose 50% Injectable 12.5 Gram(s) IV Push once  dextrose 50% Injectable 25 Gram(s) IV Push once  dextrose 50% Injectable 25 Gram(s) IV Push once  diltiazem    Tablet 60 milliGRAM(s) Oral three times a day  furosemide   Injectable 40 milliGRAM(s) IV Push Once  heparin  Injectable 5000 Unit(s) SubCutaneous every 12 hours  insulin lispro (HumaLOG) corrective regimen sliding scale   SubCutaneous three times a day before meals    MEDICATIONS  (PRN):  dextrose 40% Gel 15 Gram(s) Oral once PRN Blood Glucose LESS THAN 70 milliGRAM(s)/deciliter  glucagon  Injectable 1 milliGRAM(s) IntraMuscular once PRN Glucose LESS THAN 70 milligrams/deciliter      FAMILY HISTORY:  No pertinent family history in first degree relatives      SOCIAL HISTORY:    [ ] Non-smoker  [ ] Smoker  [ ] Alcohol    Allergies    vancomycin (Unknown)    Intolerances    	    REVIEW OF SYSTEMS:  CONSTITUTIONAL: No fever, weight loss, or fatigue  EYES: No eye pain, visual disturbances, or discharge  ENT:  No difficulty hearing, tinnitus, vertigo; No sinus or throat pain  NECK: No pain or stiffness  RESPIRATORY: No cough, wheezing, chills or hemoptysis; + Shortness of Breath  CARDIOVASCULAR: No chest pain, palpitations, passing out, dizziness, + leg swelling  GASTROINTESTINAL: No abdominal or epigastric pain. No nausea, vomiting, or hematemesis; No diarrhea or constipation. No melena or hematochezia.  GENITOURINARY: No dysuria, frequency, hematuria, or incontinence  NEUROLOGICAL: No headaches, memory loss, loss of strength, numbness, or tremors  SKIN: No itching, burning, rashes, or lesions   LYMPH Nodes: No enlarged glands  ENDOCRINE: No heat or cold intolerance; No hair loss  MUSCULOSKELETAL: No joint pain or swelling; No muscle, back, or extremity pain  PSYCHIATRIC: No depression, anxiety, mood swings, or difficulty sleeping  HEME/LYMPH: No easy bruising, or bleeding gums  ALLERGY AND IMMUNOLOGIC: No hives or eczema	    [ ] All others negative	  [ ] Unable to obtain    PHYSICAL EXAM:  T(C): 37.1 (07-03-19 @ 16:21), Max: 37.1 (07-03-19 @ 16:21)  HR: 97 (07-03-19 @ 16:21) (52 - 97)  BP: 125/71 (07-03-19 @ 16:21) (125/71 - 134/81)  RR: 18 (07-03-19 @ 16:21) (18 - 18)  SpO2: 95% (07-03-19 @ 16:21) (94% - 95%)  Wt(kg): --  I&O's Summary      Appearance: Normal	  HEENT:   Normal oral mucosa, PERRL, EOMI	  Lymphatic: No lymphadenopathy  Cardiovascular: Normal S1 S2, No JVD, + murmurs, +edema  Respiratory: rales  Psychiatry: A & O x 3, Mood & affect appropriate  Gastrointestinal:  Soft, Non-tender, + BS	  Skin: No rashes, No ecchymoses, No cyanosis	  Neurologic: Non-focal  Extremities: Normal range of motion, No clubbing, cyanosis + edema  Vascular: Peripheral pulses palpable 2+ bilaterally    TELEMETRY: 	    ECG:  	  RADIOLOGY:  OTHER: 	  	  LABS:	 	    CARDIAC MARKERS:                              12.3   19.2  )-----------( 199      ( 03 Jul 2019 16:26 )             37.9     07-03    134<L>  |  96  |  18  ----------------------------<  208<H>  4.8   |  28  |  0.80    Ca    8.4      03 Jul 2019 16:26    TPro  4.8<L>  /  Alb  2.4<L>  /  TBili  0.5  /  DBili  0.2  /  AST  14  /  ALT  9<L>  /  AlkPhos  98  07-03    proBNP:   Lipid Profile:   HgA1c:   TSH:   PT/INR - ( 03 Jul 2019 16:26 )   PT: 12.8 sec;   INR: 1.12 ratio         PTT - ( 03 Jul 2019 16:26 )  PTT:28.9 sec    PREVIOUS DIAGNOSTIC TESTING:    < from: 12 Lead ECG (03.08.19 @ 14:59) >  Diagnosis Line Atrial flutter with variable A-V block  Left axis deviation  ST depression, consider subendocardial injury  Nonspecific T wave abnormality  Abnormal ECG  No previous ECGs available    < from: Xray Chest 1 View- PORTABLE-Routine (03.07.19 @ 19:33) >  Impression: Bibasilar linear atelectasis or scar.    < from: CT Chest No Cont (08.18.18 @ 18:19) >  1. No evidence of acute fracture. No evidence of pneumothorax. No   evidence of   pleural fluid.   2. Mild up to 3.6 cm ectasia of ascending thoracic aorta.   3. Mild bilateral lower lobe dependent atelectasis/scarring.

## 2019-07-03 NOTE — ED PROVIDER NOTE - OBJECTIVE STATEMENT
89 y/o male hx of dementia, frequent falls, afib not on ac because of frequent falls (only on asa), CHF who presents to the ED from assisted living for FTT. patient with recent admissions to [facility], just discharged 48 hours ago. reportedly had PE and   pna, returned to the facility and is not eating or drinking at all. requiring a higher level of care than available. no fevers or other infectious symptoms per Hakeem RN at facility. no falls since before last admission. 89 y/o male hx of dementia, frequent falls, afib not on ac because of frequent falls (only on asa), CHF who presents to the ED from assisted living for FTT. patient with recent admissions to [facility], just discharged 48 hours ago. reportedly had PE and pna, returned to the facility and is not eating or drinking at all. requiring a higher level of care than available. no fevers or other infectious symptoms per Hakeem RN at facility. no falls since before last admission.

## 2019-07-03 NOTE — ED PROVIDER NOTE - CARE PLAN
Principal Discharge DX:	FTT (failure to thrive) in adult Principal Discharge DX:	FTT (failure to thrive) in adult  Secondary Diagnosis:	Pleural effusion  Secondary Diagnosis:	Ambulatory dysfunction  Secondary Diagnosis:	Frequent falls

## 2019-07-03 NOTE — ED ADULT NURSE NOTE - NSIMPLEMENTINTERV_GEN_ALL_ED
Implemented All Fall with Harm Risk Interventions:  [place] to call system. Call bell, personal items and telephone within reach. Instruct patient to call for assistance. Room bathroom lighting operational. Non-slip footwear when patient is off stretcher. Physically safe environment: no spills, clutter or unnecessary equipment. Stretcher in lowest position, wheels locked, appropriate side rails in place. Provide visual cue, wrist band, yellow gown, etc. Monitor gait and stability. Monitor for mental status changes and reorient to person, place, and time. Review medications for side effects contributing to fall risk. Reinforce activity limits and safety measures with patient and family. Provide visual clues: red socks.

## 2019-07-03 NOTE — H&P ADULT - HISTORY OF PRESENT ILLNESS
89 y/o M with  dementia/ poor  historian  PMHx   of  DM, CAD, A-Fib,  dementia,  falls/  PE ,   not  on a/c, mlple  falls, hypothyroidism  sent to  er by  n home  for  FTT  just  d/c  from  [facility]  48 hrs  ago   ppe r n home. pt is dnr  spoke  to  er/

## 2019-07-03 NOTE — H&P ADULT - NSICDXPASTMEDICALHX_GEN_ALL_CORE_FT
PAST MEDICAL HISTORY:  A-fib     CAD (coronary artery disease)     Diabetes     HLD (hyperlipidemia)     HTN (hypertension)     Hypothyroidism

## 2019-07-03 NOTE — H&P ADULT - NSHPLABSRESULTS_GEN_ALL_CORE
LABS:    pending LABS:    LABS:                        12.3   19.2  )-----------( 199      ( 2019 16:26 )             37.9     07-03    134<L>  |  96  |  18  ----------------------------<  208<H>  4.8   |  28  |  0.80    Ca    8.4      2019 16:26    TPro  4.8<L>  /  Alb  2.4<L>  /  TBili  0.5  /  DBili  0.2  /  AST  14  /  ALT  9<L>  /  AlkPhos  98  07-03    PT/INR - ( 2019 16:26 )   PT: 12.8 sec;   INR: 1.12 ratio         PTT - ( 2019 16:26 )  PTT:28.9 sec      Urinalysis Basic - ( 2019 16:26 )    Color: Orange / Appearance: Clear / S.030 / pH: x  Gluc: x / Ketone: Negative  / Bili: Negative / Urobili: Negative   Blood: x / Protein: Trace / Nitrite: Negative   Leuk Esterase: Negative / RBC: 71 /hpf / WBC 1 /HPF   Sq Epi: x / Non Sq Epi: 1 /hpf / Bacteria: Negative

## 2019-07-03 NOTE — ED PROVIDER NOTE - PRO INTERPRETER NEED 2
Initial Anesthesia Post-op Note    Patient: Eliel León  Procedure(s) Performed: COLONOSCOPYSKIN TAG  Anesthesia type: Monitor Anesthesia Care    Vital Last Value   Temperature 36.8 °C (98.2 °F) (07/02/18 1251)   Pulse 77 (07/02/18 1315)   Respiratory Rate 20 (07/02/18 1315)   Non-Invasive   Blood Pressure 124/78 (07/02/18 1315)   Arterial  Blood Pressure     Pulse Oximetry 100 % (07/02/18 1315)     Last 24 I/O:   Intake/Output Summary (Last 24 hours) at 07/02/18 1412  Last data filed at 07/02/18 1323   Gross per 24 hour   Intake             1123 ml   Output                0 ml   Net             1123 ml       PATIENT LOCATION: PACU Phase 1  POST-OP VITAL SIGNS: stable  LEVEL OF CONSCIOUSNESS: awake, oriented, alert, participates in exam and answers questions appropriately  RESPIRATORY STATUS: spontaneous ventilation  CARDIOVASCULAR: blood pressure returned to baseline  HYDRATION: euvolemic    PAIN MANAGEMENT: Adequate analgesia  NAUSEA: None  AIRWAY PATENCY: patent  POST-OP ASSESSMENT: no complications, patient tolerated procedure well with no complications, no evidence of recall and sufficiently recovered from acute administration of anesthesia effects and able to participate in evaluation  COMPLICATIONS: none  Comments: Late entree  HANDOFF:  Handoff to receiving nurse was performed and questions were answered       English

## 2019-07-03 NOTE — CONSULT NOTE ADULT - ASSESSMENT
89 yo male with failure to thrive, hospitalization in 3/19 at [facility], discharged from [facility] on 6/29, now admitted to [facility].  He was treated for hypoxic respiratory failure secondary to CHF and received flagyl for GI PCR panel positive for Giardia.  His stool for O and P was negative, he completed 5 days of metronidazole and his diarrhea resolved.  He had a CT with a cecal mass, lymphoma was a concern, no biopsy done.He had an elevated wbc which did not normalize.  He appears more acutely than chronically ill.I am not sure if he has an active infection.We may be confronted with establishing goals of care.  Suggest:  1.Await CXR  2.Will review [place] records  3.Blood and urine cultures have been sent  4.We will limit antibiotics to short course unless we find infection.

## 2019-07-03 NOTE — ED ADULT NURSE REASSESSMENT NOTE - NS ED NURSE REASSESS COMMENT FT1
Report received from MELISA Coombs. Upon reassessment pt resting quietly with wife at bedside. Pt A&Ox1 at this time which is baseline as per report. Report received from MELISA Coombs. Upon reassessment pt resting quietly with wife at bedside. Pt A&Ox1 at this time which is baseline as per report. Pt sating 99% on room air. Safety and comfort measures maintained. Awaiting admitting bed.

## 2019-07-03 NOTE — ED PROVIDER NOTE - PMH
A-fib    CAD (coronary artery disease)    Diabetes    HLD (hyperlipidemia)    HTN (hypertension)    Hypothyroidism

## 2019-07-03 NOTE — PATIENT PROFILE ADULT - HOME ACCESSIBILITY CONCERNS
Pt AAO x4. Has been UAL and steady on his feet. States pain 5/10 on pain scale at this time and that is tolerable for him. Pain is still in his left chest radiating down his left arm and is a constant pressure. Assessment completed and charted. Pt is aware that he should be NPO after MN for possible cardiac cath tomorrow. Denies needs at this time. Call light in reach. Will monitor. none

## 2019-07-03 NOTE — ED PROVIDER NOTE - ATTENDING CONTRIBUTION TO CARE
Jhonatan Jackson MD: generalized weakness, FTT and frequent falls, sent from [facility] as the level of care that he requires exceeds their ability. pt without any complaints at this time. prolonged discussion regarding goals of care and plan for his ED stay and admission. Jhonatan Jackson MD: generalized weakness, FTT and frequent falls, sent from [facility] as the level of care that he requires exceeds their ability. pt without any complaints at this time. prolonged discussion regarding goals of care and plan for his ED stay and admission w/ focus on comfort, and will withhold from invasive treatments/procedures and anticoagulation.

## 2019-07-04 LAB
CULTURE RESULTS: NO GROWTH — SIGNIFICANT CHANGE UP
GLUCOSE BLDC GLUCOMTR-MCNC: 105 MG/DL — HIGH (ref 70–99)
GLUCOSE BLDC GLUCOMTR-MCNC: 128 MG/DL — HIGH (ref 70–99)
GLUCOSE BLDC GLUCOMTR-MCNC: 135 MG/DL — HIGH (ref 70–99)
GLUCOSE BLDC GLUCOMTR-MCNC: 143 MG/DL — HIGH (ref 70–99)
GLUCOSE BLDC GLUCOMTR-MCNC: 144 MG/DL — HIGH (ref 70–99)
GLUCOSE BLDC GLUCOMTR-MCNC: 162 MG/DL — HIGH (ref 70–99)
GLUCOSE BLDC GLUCOMTR-MCNC: 177 MG/DL — HIGH (ref 70–99)
GLUCOSE BLDC GLUCOMTR-MCNC: 188 MG/DL — HIGH (ref 70–99)
GLUCOSE BLDC GLUCOMTR-MCNC: 209 MG/DL — HIGH (ref 70–99)
GLUCOSE BLDC GLUCOMTR-MCNC: 226 MG/DL — HIGH (ref 70–99)
GLUCOSE BLDC GLUCOMTR-MCNC: 27 MG/DL — CRITICAL LOW (ref 70–99)
GLUCOSE BLDC GLUCOMTR-MCNC: 36 MG/DL — CRITICAL LOW (ref 70–99)
GLUCOSE BLDC GLUCOMTR-MCNC: 72 MG/DL — SIGNIFICANT CHANGE UP (ref 70–99)
SPECIMEN SOURCE: SIGNIFICANT CHANGE UP

## 2019-07-04 RX ORDER — DEXTROSE 50 % IN WATER 50 %
12.5 SYRINGE (ML) INTRAVENOUS ONCE
Refills: 0 | Status: COMPLETED | OUTPATIENT
Start: 2019-07-04 | End: 2019-07-04

## 2019-07-04 RX ORDER — SODIUM CHLORIDE 9 MG/ML
1000 INJECTION, SOLUTION INTRAVENOUS
Refills: 0 | Status: DISCONTINUED | OUTPATIENT
Start: 2019-07-04 | End: 2019-07-04

## 2019-07-04 RX ORDER — DEXTROSE 50 % IN WATER 50 %
25 SYRINGE (ML) INTRAVENOUS ONCE
Refills: 0 | Status: COMPLETED | OUTPATIENT
Start: 2019-07-04 | End: 2019-07-04

## 2019-07-04 RX ORDER — SODIUM CHLORIDE 9 MG/ML
1000 INJECTION, SOLUTION INTRAVENOUS
Refills: 0 | Status: DISCONTINUED | OUTPATIENT
Start: 2019-07-04 | End: 2019-07-05

## 2019-07-04 RX ADMIN — SODIUM CHLORIDE 50 MILLILITER(S): 9 INJECTION, SOLUTION INTRAVENOUS at 05:52

## 2019-07-04 RX ADMIN — Medication 60 MILLIGRAM(S): at 06:40

## 2019-07-04 RX ADMIN — Medication 25 GRAM(S): at 04:40

## 2019-07-04 RX ADMIN — SODIUM CHLORIDE 50 MILLILITER(S): 9 INJECTION, SOLUTION INTRAVENOUS at 10:18

## 2019-07-04 RX ADMIN — Medication 12.5 GRAM(S): at 05:51

## 2019-07-04 RX ADMIN — Medication 20 MILLIGRAM(S): at 06:41

## 2019-07-04 RX ADMIN — Medication 12.5 GRAM(S): at 06:37

## 2019-07-04 RX ADMIN — HEPARIN SODIUM 5000 UNIT(S): 5000 INJECTION INTRAVENOUS; SUBCUTANEOUS at 06:41

## 2019-07-04 RX ADMIN — HEPARIN SODIUM 5000 UNIT(S): 5000 INJECTION INTRAVENOUS; SUBCUTANEOUS at 18:27

## 2019-07-04 RX ADMIN — Medication 60 MILLIGRAM(S): at 13:40

## 2019-07-04 RX ADMIN — Medication 60 MILLIGRAM(S): at 21:35

## 2019-07-04 NOTE — CHART NOTE - NSCHARTNOTEFT_GEN_A_CORE
Notified by RN for hypoglycemia with FS of 36 and 27. Hypoglycemia protocol activated by RN. Seen and examined patient at bedside. Patient alert in NAD. States "want to go home". Patient is back to baseline mental status as per RN. Denies HA, lightheadedness, CP, SOB, N/V, abd pain.     Vital Signs Last 24 Hrs  T(C): 36.4 (04 Jul 2019 03:59), Max: 37.1 (03 Jul 2019 16:21)  T(F): 97.5 (04 Jul 2019 03:59), Max: 98.8 (03 Jul 2019 16:21)  HR: 90 (04 Jul 2019 03:59) (52 - 111)  BP: 123/72 (04 Jul 2019 03:59) (111/70 - 134/81)  BP(mean): --  RR: 18 (04 Jul 2019 04:19) (18 - 20)  SpO2: 94% (04 Jul 2019 04:19) (90% - 99%)    07-03    134<L>  |  96  |  18  ----------------------------<  208<H>  4.8   |  28  |  0.80    Ca    8.4      03 Jul 2019 16:26    TPro  4.8<L>  /  Alb  2.4<L>  /  TBili  0.5  /  DBili  0.2  /  AST  14  /  ALT  9<L>  /  AlkPhos  98  07-03                          12.3   19.2  )-----------( 199      ( 03 Jul 2019 16:26 )             37.9     Physical Exam:   Constitutional: AOx1-2, thin, NAD, non-toxic appearance  Neuro: PERRLA, grossly intact, no focal deficits, hard of hearing   Respiratory: clear lungs bilaterally. No wheezing, rhonchi, or crackles. non-labored  Cardiovascular: S1 S2. No murmurs.  Gastrointestinal: soft, ND/NT, +BS in all quadrants.  Extremities/Vascular: +PP b/l LE, ++BLE edema, abrasion and ecchymosis on UE, warm to touch        ASSESSMENT/PLAN:   HPI:  89 y/o M with dementia, DM, CAD, Afib, PE not on AC 2/2 multiple falls, hypothyroidism admitted for FTT. Now with hypoglycemia.    # Hypoglycemia        - patient received Lantus 6 unit last night       - Mentating well entire time of exam       - s/p hypoglycemia protocol. Now FS of 226       - DC Lantus       - Encourage PO intake       - Continue close monitoring of clinical status and vital signs. HD stable.        - will endorse to primary team in AM       - Primary team to decide need for Lantus       - Consider Endo consult     Addendum @ 5:15       - Notified by RN for  @ 5:10a ( 36 @ 4:34 am, 27 @ 438 am)       - will check FS q 15 min until FS stabilized    Radha Martinez PA-C Notified by RN for hypoglycemia with FS of 36 and 27. Hypoglycemia protocol activated by RN. Seen and examined patient at bedside. Patient alert in NAD. States "want to go home". Patient is back to baseline mental status as per RN. Denies HA, lightheadedness, CP, SOB, N/V, abd pain.     Vital Signs Last 24 Hrs  T(C): 36.4 (04 Jul 2019 03:59), Max: 37.1 (03 Jul 2019 16:21)  T(F): 97.5 (04 Jul 2019 03:59), Max: 98.8 (03 Jul 2019 16:21)  HR: 90 (04 Jul 2019 03:59) (52 - 111)  BP: 123/72 (04 Jul 2019 03:59) (111/70 - 134/81)  BP(mean): --  RR: 18 (04 Jul 2019 04:19) (18 - 20)  SpO2: 94% (04 Jul 2019 04:19) (90% - 99%)    07-03    134<L>  |  96  |  18  ----------------------------<  208<H>  4.8   |  28  |  0.80    Ca    8.4      03 Jul 2019 16:26    TPro  4.8<L>  /  Alb  2.4<L>  /  TBili  0.5  /  DBili  0.2  /  AST  14  /  ALT  9<L>  /  AlkPhos  98  07-03                          12.3   19.2  )-----------( 199      ( 03 Jul 2019 16:26 )             37.9     Physical Exam:   Constitutional: AOx1-2, thin, NAD, non-toxic appearance  Neuro: PERRLA, grossly intact, no focal deficits, hard of hearing   Respiratory: clear lungs bilaterally. No wheezing, rhonchi, or crackles. non-labored  Cardiovascular: S1 S2. No murmurs.  Gastrointestinal: soft, ND/NT, +BS in all quadrants.  Extremities/Vascular: +PP b/l LE, ++BLE edema, abrasion and ecchymosis on UE, warm to touch        ASSESSMENT/PLAN:   HPI:  89 y/o M with dementia, DM, CAD, Afib, PE not on AC 2/2 multiple falls, hypothyroidism admitted for FTT. Now with hypoglycemia.    # Hypoglycemia        - patient received Lantus 6 unit last night       - Mentating well entire time of exam       - s/p hypoglycemia protocol. Now FS of 226       - DC Lantus       - Encourage PO intake       - Continue close monitoring of clinical status and vital signs. HD stable.        - will endorse to primary team in AM       - Primary team to decide need for Lantus       - Consider Endo consult     Addendum @ 5:15       - Notified by RN for  @ 5:10a ( 226 @ 4:45 s/p D5, 36 @ 4:34 am, 27 @ 438 am)       - Will check FS q 15 min until FS stabilized    Radha Martinez PA-C Notified by RN for hypoglycemia with FS of 36 and 27. Hypoglycemia protocol activated by RN. Seen and examined patient at bedside. Patient alert in NAD. States "want to go home". Patient is back to baseline mental status as per RN. Denies HA, lightheadedness, CP, SOB, N/V, abd pain.     Vital Signs Last 24 Hrs  T(C): 36.4 (04 Jul 2019 03:59), Max: 37.1 (03 Jul 2019 16:21)  T(F): 97.5 (04 Jul 2019 03:59), Max: 98.8 (03 Jul 2019 16:21)  HR: 90 (04 Jul 2019 03:59) (52 - 111)  BP: 123/72 (04 Jul 2019 03:59) (111/70 - 134/81)  BP(mean): --  RR: 18 (04 Jul 2019 04:19) (18 - 20)  SpO2: 94% (04 Jul 2019 04:19) (90% - 99%)    07-03    134<L>  |  96  |  18  ----------------------------<  208<H>  4.8   |  28  |  0.80    Ca    8.4      03 Jul 2019 16:26    TPro  4.8<L>  /  Alb  2.4<L>  /  TBili  0.5  /  DBili  0.2  /  AST  14  /  ALT  9<L>  /  AlkPhos  98  07-03                          12.3   19.2  )-----------( 199      ( 03 Jul 2019 16:26 )             37.9     Physical Exam:   Constitutional: AOx1-2, thin, NAD, non-toxic appearance  Neuro: PERRLA, grossly intact, no focal deficits, hard of hearing   Respiratory: clear lungs bilaterally. No wheezing, rhonchi, or crackles. non-labored  Cardiovascular: S1 S2. No murmurs.  Gastrointestinal: soft, ND/NT, +BS in all quadrants.  Extremities/Vascular: +PP b/l LE, ++BLE edema, abrasion and ecchymosis on UE, warm to touch        ASSESSMENT/PLAN:   HPI:  89 y/o M with dementia, DM, CAD, Afib, PE not on AC 2/2 multiple falls, hypothyroidism admitted for FTT. Now with hypoglycemia.    # Hypoglycemia        - patient received Lantus 6 unit last night       - Mentating well entire time of exam       - s/p hypoglycemia protocol. Now FS of 226       - DC Lantus       - Encourage PO intake       - Continue close monitoring of clinical status and vital signs. HD stable.        - will endorse to primary team in AM       - Primary team to decide need for Lantus       - Consider Endo consult     Addendum @ 5:15       - Notified by RN for  @ 5:10a ( 226 @ 4:45 s/p D5, 36 @ 4:34 am, 27 @ 438 am)       - Will check FS q 15 min until FS stabilized    Addendum @ 5:50 am       - FS continues to drop. Patient mentating fine, following command. Will continue to check FS q15min for now.      Radha Martinez PA-C Notified by RN for hypoglycemia with FS of 36 and 27. Hypoglycemia protocol activated by RN. Seen and examined patient at bedside. Patient alert in NAD. States "want to go home". Patient is back to baseline mental status as per RN. Denies HA, lightheadedness, CP, SOB, N/V, abd pain.     Vital Signs Last 24 Hrs  T(C): 36.4 (04 Jul 2019 03:59), Max: 37.1 (03 Jul 2019 16:21)  T(F): 97.5 (04 Jul 2019 03:59), Max: 98.8 (03 Jul 2019 16:21)  HR: 90 (04 Jul 2019 03:59) (52 - 111)  BP: 123/72 (04 Jul 2019 03:59) (111/70 - 134/81)  BP(mean): --  RR: 18 (04 Jul 2019 04:19) (18 - 20)  SpO2: 94% (04 Jul 2019 04:19) (90% - 99%)    07-03    134<L>  |  96  |  18  ----------------------------<  208<H>  4.8   |  28  |  0.80    Ca    8.4      03 Jul 2019 16:26    TPro  4.8<L>  /  Alb  2.4<L>  /  TBili  0.5  /  DBili  0.2  /  AST  14  /  ALT  9<L>  /  AlkPhos  98  07-03                          12.3   19.2  )-----------( 199      ( 03 Jul 2019 16:26 )             37.9     Physical Exam:   Constitutional: AOx1-2, thin, NAD, non-toxic appearance  Neuro: PERRLA, grossly intact, no focal deficits, hard of hearing   Respiratory: clear lungs bilaterally. No wheezing, rhonchi, or crackles. non-labored  Cardiovascular: S1 S2. No murmurs.  Gastrointestinal: soft, ND/NT, +BS in all quadrants.  Extremities/Vascular: +PP b/l LE, ++BLE edema, abrasion and ecchymosis on UE, warm to touch        ASSESSMENT/PLAN:   HPI:  89 y/o M with dementia, DM, CAD, Afib, PE not on AC 2/2 multiple falls, hypothyroidism admitted for FTT. Now with hypoglycemia.    # Hypoglycemia        - patient received Lantus 6 unit last night       - Mentating well entire time of exam       - s/p hypoglycemia protocol. Now FS of 226       - DC Lantus       - Encourage PO intake       - Continue close monitoring of clinical status and vital signs. HD stable.        - will endorse to primary team in AM       - Primary team to decide need for Lantus       - Consider Endo consult     Addendum @ 5:15       - Notified by RN for  @ 5:10a ( 226 @ 4:45 s/p D5, 36 @ 4:34 am, 27 @ 438 am)       - Will check FS q 15 min until FS stabilized    Addendum @ 5:50 am       - FS continues to drop. Patient mentating fine, following command. Will continue to check FS q15min for now. RN and PCA advised to closely monitor patient and notify provider with mental status and trend of FS. RN to follow hypoglycemia protocol. Pt is now on D50 @ 50mL/hr     Radha Martinez PA-C Notified by RN for hypoglycemia with FS of 36 and 27. Hypoglycemia protocol activated by RN. Seen and examined patient at bedside. Patient alert in NAD. States "want to go home". Patient is back to baseline mental status as per RN. Denies HA, lightheadedness, CP, SOB, N/V, abd pain.     Vital Signs Last 24 Hrs  T(C): 36.4 (04 Jul 2019 03:59), Max: 37.1 (03 Jul 2019 16:21)  T(F): 97.5 (04 Jul 2019 03:59), Max: 98.8 (03 Jul 2019 16:21)  HR: 90 (04 Jul 2019 03:59) (52 - 111)  BP: 123/72 (04 Jul 2019 03:59) (111/70 - 134/81)  BP(mean): --  RR: 18 (04 Jul 2019 04:19) (18 - 20)  SpO2: 94% (04 Jul 2019 04:19) (90% - 99%)    07-03    134<L>  |  96  |  18  ----------------------------<  208<H>  4.8   |  28  |  0.80    Ca    8.4      03 Jul 2019 16:26    TPro  4.8<L>  /  Alb  2.4<L>  /  TBili  0.5  /  DBili  0.2  /  AST  14  /  ALT  9<L>  /  AlkPhos  98  07-03                          12.3   19.2  )-----------( 199      ( 03 Jul 2019 16:26 )             37.9     Physical Exam:   Constitutional: AOx1-2, thin, NAD, non-toxic appearance  Neuro: PERRLA, grossly intact, no focal deficits, hard of hearing   Respiratory: clear lungs bilaterally. No wheezing, rhonchi, or crackles. non-labored  Cardiovascular: S1 S2. No murmurs.  Gastrointestinal: soft, ND/NT, +BS in all quadrants.  Extremities/Vascular: +PP b/l LE, ++BLE edema, abrasion and ecchymosis on UE, warm to touch        ASSESSMENT/PLAN:   HPI:  89 y/o M with dementia, DM, CAD, Afib, PE not on AC 2/2 multiple falls, hypothyroidism admitted for FTT. Now with hypoglycemia.    # Hypoglycemia        - patient received Lantus 6 unit last night       - Mentating well entire time of exam       - s/p hypoglycemia protocol. Now FS of 226       - DC Lantus       - Encourage PO intake       - Continue close monitoring of clinical status and vital signs. HD stable.        - will endorse to primary team in AM       - Primary team to decide need for Lantus       - Consider Endo consult     Addendum @ 5:15       - Notified by RN for  @ 5:10a ( 226 @ 4:45 s/p D5, 36 @ 4:34 am, 27 @ 438 am)       - Will check FS q 15 min until FS stabilized    Addendum @ 5:50 am       - FS continues to drop. Patient mentating fine, following command. Will continue to check FS q15min for now. RN and PCA advised to closely monitor patient and notify provider with mental status changes and trend of FS. RN to follow hypoglycemia protocol. Pt is now on D50 @ 50mL/hr. Will endorse to primary team in AM.    Radha Martinez PA-C Notified by RN for hypoglycemia with FS of 36 and 27. Hypoglycemia protocol activated by RN. Seen and examined patient at bedside. Patient alert in NAD. States "want to go home". Patient is back to baseline mental status as per RN. Denies HA, lightheadedness, CP, SOB, N/V, abd pain.     Vital Signs Last 24 Hrs  T(C): 36.4 (04 Jul 2019 03:59), Max: 37.1 (03 Jul 2019 16:21)  T(F): 97.5 (04 Jul 2019 03:59), Max: 98.8 (03 Jul 2019 16:21)  HR: 90 (04 Jul 2019 03:59) (52 - 111)  BP: 123/72 (04 Jul 2019 03:59) (111/70 - 134/81)  BP(mean): --  RR: 18 (04 Jul 2019 04:19) (18 - 20)  SpO2: 94% (04 Jul 2019 04:19) (90% - 99%)    07-03    134<L>  |  96  |  18  ----------------------------<  208<H>  4.8   |  28  |  0.80    Ca    8.4      03 Jul 2019 16:26    TPro  4.8<L>  /  Alb  2.4<L>  /  TBili  0.5  /  DBili  0.2  /  AST  14  /  ALT  9<L>  /  AlkPhos  98  07-03                          12.3   19.2  )-----------( 199      ( 03 Jul 2019 16:26 )             37.9     Physical Exam:   Constitutional: AOx1-2, thin, NAD, non-toxic appearance  Neuro: PERRLA, grossly intact, no focal deficits, hard of hearing   Respiratory: clear lungs bilaterally. No wheezing, rhonchi, or crackles. non-labored  Cardiovascular: S1 S2. No murmurs.  Gastrointestinal: soft, ND/NT, +BS in all quadrants.  Extremities/Vascular: +PP b/l LE, ++BLE edema, abrasion and ecchymosis on UE, warm to touch        ASSESSMENT/PLAN:   HPI:  89 y/o M with dementia, DM, CAD, Afib, PE not on AC 2/2 multiple falls, hypothyroidism admitted for FTT. Now with hypoglycemia.    # Hypoglycemia        - patient received Lantus 6 unit last night       - Mentating well entire time of exam       - s/p hypoglycemia protocol. Now FS of 226       - DC Lantus       - Encourage PO intake       - Continue close monitoring of clinical status and vital signs. HD stable.        - will endorse to primary team in AM       - Primary team to decide need for Lantus       - Consider Endo consult     Addendum @ 5:15       - Notified by RN for  @ 5:10a ( 226 @ 4:45 s/p D5, 36 @ 4:34 am, 27 @ 438 am)       - Will check FS q 15 min until FS stabilized    Addendum @ 5:50 am       - FS continues to drop. Patient mentating fine, following command. Will continue to check FS q15min for now. RN and PCA advised to closely monitor patient and notify provider with mental status changes and trend of FS. RN to follow hypoglycemia protocol. Dr. Upton made aware of the event. Dr. Upton recommends DC Lantus, 2 amp of D50, D5 w/ half NS @ 50cc/hr, FS q15min until FS > 100 x 2, and followed by FS q 4hrs. Discussed the plan of care with RN and patient, pt HD stable.      Radha Martinez PA-C

## 2019-07-04 NOTE — PROGRESS NOTE ADULT - SUBJECTIVE AND OBJECTIVE BOX
afebrile  s/p hypogycemia/ ,better  now    REVIEW OF SYSTEMS:  GEN: no fever,    no chills  RESP: no SOB,   no cough  CVS: no chest pain,   no palpitations  GI: no abdominal pain,   no nausea,   no vomiting,   no constipation,   no diarrhea  : no dysuria,   no frequency  NEURO: no headache,   no dizziness  PSYCH: no depression,   not anxious  Derm : no rash    MEDICATIONS  (STANDING):  cefTRIAXone   IVPB 1000 milliGRAM(s) IV Intermittent every 24 hours  dextrose 5% + sodium chloride 0.45%. 1000 milliLiter(s) (50 mL/Hr) IV Continuous <Continuous>  dextrose 5%. 1000 milliLiter(s) (50 mL/Hr) IV Continuous <Continuous>  dextrose 50% Injectable 12.5 Gram(s) IV Push once  dextrose 50% Injectable 25 Gram(s) IV Push once  dextrose 50% Injectable 25 Gram(s) IV Push once  diltiazem    Tablet 60 milliGRAM(s) Oral three times a day  furosemide   Injectable 20 milliGRAM(s) IV Push daily  heparin  Injectable 5000 Unit(s) SubCutaneous every 12 hours  insulin lispro (HumaLOG) corrective regimen sliding scale   SubCutaneous three times a day before meals    MEDICATIONS  (PRN):  dextrose 40% Gel 15 Gram(s) Oral once PRN Blood Glucose LESS THAN 70 milliGRAM(s)/deciliter  glucagon  Injectable 1 milliGRAM(s) IntraMuscular once PRN Glucose LESS THAN 70 milligrams/deciliter      Vital Signs Last 24 Hrs  T(C): 36.4 (2019 03:59), Max: 37.1 (2019 16:21)  T(F): 97.5 (2019 03:59), Max: 98.8 (2019 16:21)  HR: 90 (2019 03:59) (52 - 111)  BP: 123/72 (2019 03:59) (111/70 - 134/81)  BP(mean): --  RR: 18 (2019 04:19) (18 - 20)  SpO2: 94% (2019 04:19) (90% - 99%)  CAPILLARY BLOOD GLUCOSE      POCT Blood Glucose.: 177 mg/dL (2019 06:55)  POCT Blood Glucose.: 162 mg/dL (2019 06:28)  POCT Blood Glucose.: 188 mg/dL (2019 06:09)  POCT Blood Glucose.: 72 mg/dL (2019 05:45)  POCT Blood Glucose.: 105 mg/dL (2019 05:27)  POCT Blood Glucose.: 128 mg/dL (2019 05:10)  POCT Blood Glucose.: 226 mg/dL (2019 04:45)  POCT Blood Glucose.: 27 mg/dL (2019 04:38)  POCT Blood Glucose.: 36 mg/dL (2019 04:34)  POCT Blood Glucose.: 148 mg/dL (2019 22:04)  POCT Blood Glucose.: 201 mg/dL (2019 16:22)    I&O's Summary    2019 07:01  -  2019 07:00  --------------------------------------------------------  IN: 60 mL / OUT: 0 mL / NET: 60 mL        PHYSICAL EXAM:  HEAD:  Atraumatic, Normocephalic  NECK: Supple, No   JVD  CHEST/LUNG:   no     rales,     no,    rhonchi  HEART: Regular rate and rhythm;         murmur  ABDOMEN: Soft, Nontender, ;   EXTREMITIES:    b/l     edema  NEUROLOGY:  alert    LABS:                        12.3   19.2  )-----------( 199      ( 2019 16:26 )             37.9     07-03    134<L>  |  96  |  18  ----------------------------<  208<H>  4.8   |  28  |  0.80    Ca    8.4      2019 16:26    TPro  4.8<L>  /  Alb  2.4<L>  /  TBili  0.5  /  DBili  0.2  /  AST  14  /  ALT  9<L>  /  AlkPhos  98  07-03    PT/INR - ( 2019 16:26 )   PT: 12.8 sec;   INR: 1.12 ratio         PTT - ( 2019 16:26 )  PTT:28.9 sec      Urinalysis Basic - ( 2019 16:26 )    Color: Orange / Appearance: Clear / S.030 / pH: x  Gluc: x / Ketone: Negative  / Bili: Negative / Urobili: Negative   Blood: x / Protein: Trace / Nitrite: Negative   Leuk Esterase: Negative / RBC: 71 /hpf / WBC 1 /HPF   Sq Epi: x / Non Sq Epi: 1 /hpf / Bacteria: Negative            Hemoglobin A1C, Whole Blood: 7.5 % ( @ 20:18)            Consultant(s) Notes Reviewed:      Care Discussed with Consultants/Other Providers:

## 2019-07-04 NOTE — PROGRESS NOTE ADULT - ASSESSMENT
89 yo male with failure to thrive, hospitalization in 3/19 at [facility], discharged from [facility] on 6/29, now admitted to [facility].  He was treated for hypoxic respiratory failure secondary to CHF and received flagyl for GI PCR panel positive for Giardia.  His stool for O and P was negative, he completed 5 days of metronidazole and his diarrhea resolved.  He had a CT with a cecal mass, lymphoma was a concern, no biopsy done.He had an elevated wbc which did not normalize.  He appears more acutely than chronically ill.I am not sure if he has an active infection.We may be confronted with establishing goals of care.  with his CXR not showing any infiltrates I see no reason to continue antibiotics  Suggest:  1.Stop CTX  2.Blood and urine cultures have been sent, await results  4.Giardia treated with metronidazole, will see if he has any more GI symptoms. 89 yo male with failure to thrive, hospitalization in 3/19 at [facility], discharged from [facility] on 6/29, now admitted to [facility].  He was treated for hypoxic respiratory failure secondary to CHF and received flagyl for GI PCR panel positive for Giardia.  His stool for O and P was negative, he completed 5 days of metronidazole and his diarrhea resolved.  He had a CT with a cecal mass, lymphoma was a concern, no biopsy done.He had an elevated wbc which did not normalize.  He appears more acutely than chronically ill.I am not sure if he has an active infection.We may be confronted with establishing goals of care.  with his CXR not showing any infiltrates I see no reason to continue antibiotics.  [facility] records were reviewed, no infection but there secondary to falls.He was found to have a leukocytosis, felt "reactive".  Suggest:  1.Stop CTX  2.Blood and urine cultures have been sent, await results  4.Giardia treated with metronidazole, will see if he has any more GI symptoms.

## 2019-07-04 NOTE — PROGRESS NOTE ADULT - SUBJECTIVE AND OBJECTIVE BOX
CARDIOLOGY     PROGRESS  NOTE   ________________________________________________    CHIEF COMPLAINT:Patient is a 89y old  Male who presents with a chief complaint of FTT (04 Jul 2019 08:13)  doing better.  	  REVIEW OF SYSTEMS:  CONSTITUTIONAL: No fever, weight loss, or fatigue  EYES: No eye pain, visual disturbances, or discharge  ENT:  No difficulty hearing, tinnitus, vertigo; No sinus or throat pain  NECK: No pain or stiffness  RESPIRATORY: No cough, wheezing, chills or hemoptysis; No Shortness of Breath  CARDIOVASCULAR: No chest pain, palpitations, passing out, dizziness, or leg swelling  GASTROINTESTINAL: No abdominal or epigastric pain. No nausea, vomiting, or hematemesis; No diarrhea or constipation. No melena or hematochezia.  GENITOURINARY: No dysuria, frequency, hematuria, or incontinence  NEUROLOGICAL: No headaches, memory loss, loss of strength, numbness, or tremors  SKIN: No itching, burning, rashes, or lesions   LYMPH Nodes: No enlarged glands  ENDOCRINE: No heat or cold intolerance; No hair loss  MUSCULOSKELETAL: No joint pain or swelling; No muscle, back, or extremity pain  PSYCHIATRIC: No depression, anxiety, mood swings, or difficulty sleeping  HEME/LYMPH: No easy bruising, or bleeding gums  ALLERGY AND IMMUNOLOGIC: No hives or eczema	    [ ] All others negative	  [x ] Unable to obtain    PHYSICAL EXAM:  T(C): 36.4 (07-04-19 @ 03:59), Max: 37.1 (07-03-19 @ 16:21)  HR: 90 (07-04-19 @ 03:59) (52 - 111)  BP: 123/72 (07-04-19 @ 03:59) (111/70 - 134/81)  RR: 18 (07-04-19 @ 04:19) (18 - 20)  SpO2: 94% (07-04-19 @ 04:19) (90% - 99%)  Wt(kg): --  I&O's Summary    03 Jul 2019 07:01  -  04 Jul 2019 07:00  --------------------------------------------------------  IN: 60 mL / OUT: 0 mL / NET: 60 mL        Appearance: Normal	  HEENT:   Normal oral mucosa, PERRL, EOMI	  Lymphatic: No lymphadenopathy  Cardiovascular: Normal S1 S2, No JVD, + murmurs, +edema  Respiratory: Lungs clear to auscultation	  Psychiatry: A & O x 3, Mood & affect appropriate  Gastrointestinal:  Soft, Non-tender, + BS	  Skin: No rashes, No ecchymoses, No cyanosis	  Neurologic: Non-focal  Extremities: Normal range of motion, No clubbing, cyanosis .+ edema  Vascular: Peripheral pulses palpable 2+ bilaterally    MEDICATIONS  (STANDING):  cefTRIAXone   IVPB 1000 milliGRAM(s) IV Intermittent every 24 hours  dextrose 5% + sodium chloride 0.45%. 1000 milliLiter(s) (50 mL/Hr) IV Continuous <Continuous>  dextrose 5%. 1000 milliLiter(s) (50 mL/Hr) IV Continuous <Continuous>  dextrose 50% Injectable 12.5 Gram(s) IV Push once  dextrose 50% Injectable 25 Gram(s) IV Push once  dextrose 50% Injectable 25 Gram(s) IV Push once  diltiazem    Tablet 60 milliGRAM(s) Oral three times a day  furosemide   Injectable 20 milliGRAM(s) IV Push daily  heparin  Injectable 5000 Unit(s) SubCutaneous every 12 hours  insulin lispro (HumaLOG) corrective regimen sliding scale   SubCutaneous three times a day before meals      TELEMETRY: 	    ECG:  	  RADIOLOGY:  OTHER: 	  	  LABS:	 	    CARDIAC MARKERS:                                12.3   19.2  )-----------( 199      ( 03 Jul 2019 16:26 )             37.9     07-03    134<L>  |  96  |  18  ----------------------------<  208<H>  4.8   |  28  |  0.80    Ca    8.4      03 Jul 2019 16:26    TPro  4.8<L>  /  Alb  2.4<L>  /  TBili  0.5  /  DBili  0.2  /  AST  14  /  ALT  9<L>  /  AlkPhos  98  07-03    proBNP:   Lipid Profile:   HgA1c: Hemoglobin A1C, Whole Blood: 7.5 % (07-03 @ 20:18)    TSH:   PT/INR - ( 03 Jul 2019 16:26 )   PT: 12.8 sec;   INR: 1.12 ratio         PTT - ( 03 Jul 2019 16:26 )  PTT:28.9 sec  < from: Xray Chest 1 View AP/PA (07.03.19 @ 17:50) >  INTERPRETATION:  Small bilateral pleural effusions.    < end of copied text >      Assessment and plan  ---------------------------  pt with le edema ?hx of PE on no ac  check le venous doppler if + for dvt consider ivc filter  decrease lasix  conservative management  nutrition cobsult

## 2019-07-04 NOTE — PROGRESS NOTE ADULT - ASSESSMENT
89 y/o M with     PMHx significant for A-Fib (No AC 2/2 Hx falls),  hypothyroidism,  anemia, dementia  cecal mass/ no intervention done     presenting to the ED   with  FTT  apparently pt  has  been in different  hospitals/  wife at bedside  not  a good  historian either     DM2 ,  follow  fs/ glucose  in er,  was  208/ prior  admission, pt  was  on lantus 10 units    not on lantus now/       H/o Afib, on cardizem /  -not on AC due to  mlple  falsl      wbc of 18,000/  afebrile/  cxr no pna/    pt with ?b/l leg cellulitis  pt with c/c elevtaed wbc  and h/o  Cecal  mass on ct, no intervention given advanced age  on  Rocephin  oral  feeds/  iv fluids   pedal  edema/  on iv lasix/   leg dopplers ,/   pt has recent h/o PE and   has been deemed,  not  a candidate for a/c,  due to mlple falls/  not sure if pt has filter  defer   duration of  ab to ID  pt is  DNR/ DNI

## 2019-07-04 NOTE — PROGRESS NOTE ADULT - SUBJECTIVE AND OBJECTIVE BOX
CC: f/u for leukocytosis and failure to thrive    Patient reports:he is confused but appears comfortable, not a good historian    REVIEW OF SYSTEMS:  All other review of systems negative (Comprehensive ROS)    Antimicrobials Day #  :CTX stopped    Other Medications Reviewed    T(F): 98 (19 @ 11:41), Max: 98.8 (19 @ 16:21)  HR: 66 (19 @ 11:41)  BP: 120/75 (19 @ 11:41)  RR: 18 (19 @ 11:41)  SpO2: 92% (19 @ 11:41)  Wt(kg): --    PHYSICAL EXAM:  General: alert, no acute distress, frail and wasted  Eyes:  anicteric, no conjunctival injection, no discharge  Oropharynx: no lesions or injection 	  Neck: supple, without adenopathy  Lungs: clear to auscultation  Heart: regular rate and rhythm; no murmur, rubs or gallops  Abdomen: soft, nondistended, nontender, without mass or organomegaly  Skin: no lesions  Extremities: no clubbing, cyanosis, trace edema  Neurologic: alert, confused, moves all extremities    LAB RESULTS:                        12.3   19.2  )-----------( 199      ( 2019 16:26 )             37.9     07-03    134<L>  |  96  |  18  ----------------------------<  208<H>  4.8   |  28  |  0.80    Ca    8.4      2019 16:26    TPro  4.8<L>  /  Alb  2.4<L>  /  TBili  0.5  /  DBili  0.2  /  AST  14  /  ALT  9<L>  /  AlkPhos  98  07-03    LIVER FUNCTIONS - ( 2019 16:26 )  Alb: 2.4 g/dL / Pro: 4.8 g/dL / ALK PHOS: 98 U/L / ALT: 9 U/L / AST: 14 U/L / GGT: x           Urinalysis Basic - ( 2019 16:26 )    Color: Orange / Appearance: Clear / S.030 / pH: x  Gluc: x / Ketone: Negative  / Bili: Negative / Urobili: Negative   Blood: x / Protein: Trace / Nitrite: Negative   Leuk Esterase: Negative / RBC: 71 /hpf / WBC 1 /HPF   Sq Epi: x / Non Sq Epi: 1 /hpf / Bacteria: Negative      MICROBIOLOGY:  RECENT CULTURES:      RADIOLOGY REVIEWED:    < from: Xray Chest 1 View AP/PA (19 @ 17:50) >  IMPRESSION:    Small bilateral pleuraleffusions and bibasilar atelectasis.    < end of copied text >

## 2019-07-05 ENCOUNTER — TRANSCRIPTION ENCOUNTER (OUTPATIENT)
Age: 84
End: 2019-07-05

## 2019-07-05 DIAGNOSIS — K63.9 DISEASE OF INTESTINE, UNSPECIFIED: ICD-10-CM

## 2019-07-05 DIAGNOSIS — Z71.89 OTHER SPECIFIED COUNSELING: ICD-10-CM

## 2019-07-05 LAB
ANION GAP SERPL CALC-SCNC: 10 MMOL/L — SIGNIFICANT CHANGE UP (ref 5–17)
BUN SERPL-MCNC: 16 MG/DL — SIGNIFICANT CHANGE UP (ref 7–23)
CALCIUM SERPL-MCNC: 7.6 MG/DL — LOW (ref 8.4–10.5)
CHLORIDE SERPL-SCNC: 96 MMOL/L — SIGNIFICANT CHANGE UP (ref 96–108)
CO2 SERPL-SCNC: 29 MMOL/L — SIGNIFICANT CHANGE UP (ref 22–31)
CREAT SERPL-MCNC: 0.79 MG/DL — SIGNIFICANT CHANGE UP (ref 0.5–1.3)
GLUCOSE BLDC GLUCOMTR-MCNC: 130 MG/DL — HIGH (ref 70–99)
GLUCOSE BLDC GLUCOMTR-MCNC: 158 MG/DL — HIGH (ref 70–99)
GLUCOSE BLDC GLUCOMTR-MCNC: 199 MG/DL — HIGH (ref 70–99)
GLUCOSE BLDC GLUCOMTR-MCNC: 201 MG/DL — HIGH (ref 70–99)
GLUCOSE SERPL-MCNC: 176 MG/DL — HIGH (ref 70–99)
HBA1C BLD-MCNC: 7.5 % — HIGH (ref 4–5.6)
HCT VFR BLD CALC: 35.8 % — LOW (ref 39–50)
HGB BLD-MCNC: 11.3 G/DL — LOW (ref 13–17)
MCHC RBC-ENTMCNC: 27.7 PG — SIGNIFICANT CHANGE UP (ref 27–34)
MCHC RBC-ENTMCNC: 31.6 GM/DL — LOW (ref 32–36)
MCV RBC AUTO: 87.7 FL — SIGNIFICANT CHANGE UP (ref 80–100)
PLATELET # BLD AUTO: 208 K/UL — SIGNIFICANT CHANGE UP (ref 150–400)
POTASSIUM SERPL-MCNC: 3.6 MMOL/L — SIGNIFICANT CHANGE UP (ref 3.5–5.3)
POTASSIUM SERPL-SCNC: 3.6 MMOL/L — SIGNIFICANT CHANGE UP (ref 3.5–5.3)
RBC # BLD: 4.08 M/UL — LOW (ref 4.2–5.8)
RBC # FLD: 15.8 % — HIGH (ref 10.3–14.5)
SODIUM SERPL-SCNC: 135 MMOL/L — SIGNIFICANT CHANGE UP (ref 135–145)
WBC # BLD: 15.4 K/UL — HIGH (ref 3.8–10.5)
WBC # FLD AUTO: 15.4 K/UL — HIGH (ref 3.8–10.5)

## 2019-07-05 PROCEDURE — 93970 EXTREMITY STUDY: CPT | Mod: 26

## 2019-07-05 RX ADMIN — HEPARIN SODIUM 5000 UNIT(S): 5000 INJECTION INTRAVENOUS; SUBCUTANEOUS at 05:30

## 2019-07-05 RX ADMIN — Medication 2: at 09:05

## 2019-07-05 RX ADMIN — Medication 60 MILLIGRAM(S): at 22:35

## 2019-07-05 RX ADMIN — Medication 1: at 12:48

## 2019-07-05 RX ADMIN — HEPARIN SODIUM 5000 UNIT(S): 5000 INJECTION INTRAVENOUS; SUBCUTANEOUS at 17:44

## 2019-07-05 RX ADMIN — Medication 20 MILLIGRAM(S): at 05:30

## 2019-07-05 RX ADMIN — Medication 60 MILLIGRAM(S): at 05:30

## 2019-07-05 RX ADMIN — Medication 60 MILLIGRAM(S): at 14:27

## 2019-07-05 NOTE — DISCHARGE NOTE NURSING/CASE MANAGEMENT/SOCIAL WORK - NSDCDPATPORTLINK_GEN_ALL_CORE
You can access the [facility] Patient Portal, offered by [facility], by registering with the following website: [email or web address]

## 2019-07-05 NOTE — DISCHARGE NOTE PROVIDER - CARE PROVIDER_API CALL
Sejal Upton)  Internal Medicine  [address] [place] [address]  Phone: [phone]  Fax: [phone]  Follow Up Time:

## 2019-07-05 NOTE — DISCHARGE NOTE NURSING/CASE MANAGEMENT/SOCIAL WORK - NSDCPEWEB_GEN_ALL_CORE
[facility] for Tobacco Control website --- [email or web address] website --- [email or web address]

## 2019-07-05 NOTE — PHYSICAL THERAPY INITIAL EVALUATION ADULT - ACTIVE RANGE OF MOTION EXAMINATION, REHAB EVAL
BLE knee ankle AROM WFL, B/L hip active assist ROM WFL/bilateral upper extremity Active ROM was WFL (within functional limits)

## 2019-07-05 NOTE — PROGRESS NOTE ADULT - SUBJECTIVE AND OBJECTIVE BOX
doing better/  no cp/sob    REVIEW OF SYSTEMS:  GEN: no fever,    no chills  RESP: no SOB,   no cough  CVS: no chest pain,   no palpitations  GI: no abdominal pain,   no nausea,   no vomiting,   no constipation,   no diarrhea  : no dysuria,   no frequency  NEURO: no headache,   no dizziness  PSYCH: no depression,   not anxious  Derm : no rash    MEDICATIONS  (STANDING):  dextrose 5%. 1000 milliLiter(s) (50 mL/Hr) IV Continuous <Continuous>  dextrose 50% Injectable 12.5 Gram(s) IV Push once  dextrose 50% Injectable 25 Gram(s) IV Push once  dextrose 50% Injectable 25 Gram(s) IV Push once  diltiazem    Tablet 60 milliGRAM(s) Oral three times a day  furosemide   Injectable 20 milliGRAM(s) IV Push daily  heparin  Injectable 5000 Unit(s) SubCutaneous every 12 hours  insulin lispro (HumaLOG) corrective regimen sliding scale   SubCutaneous three times a day before meals    MEDICATIONS  (PRN):  dextrose 40% Gel 15 Gram(s) Oral once PRN Blood Glucose LESS THAN 70 milliGRAM(s)/deciliter  glucagon  Injectable 1 milliGRAM(s) IntraMuscular once PRN Glucose LESS THAN 70 milligrams/deciliter      Vital Signs Last 24 Hrs  T(C): 36.6 (2019 04:06), Max: 36.7 (2019 11:41)  T(F): 97.8 (2019 04:06), Max: 98 (2019 11:41)  HR: 81 (2019 04:06) (66 - 85)  BP: 109/71 (2019 04:06) (109/71 - 120/75)  BP(mean): --  RR: 18 (2019 04:06) (18 - 19)  SpO2: 98% (2019 04:06) (92% - 98%)  CAPILLARY BLOOD GLUCOSE      POCT Blood Glucose.: 209 mg/dL (2019 21:42)  POCT Blood Glucose.: 143 mg/dL (2019 17:18)  POCT Blood Glucose.: 144 mg/dL (2019 12:34)  POCT Blood Glucose.: 135 mg/dL (2019 08:14)    I&O's Summary    2019 07:01  -  2019 07:00  --------------------------------------------------------  IN: 420 mL / OUT: 300 mL / NET: 120 mL        PHYSICAL EXAM:  HEAD:  Atraumatic, Normocephalic  NECK: Supple, No   JVD  CHEST/LUNG:   no     rales,     no,    rhonchi  HEART: Regular rate and rhythm;         murmur  ABDOMEN: Soft, Nontender, ;   EXTREMITIES:    less    edema  NEUROLOGY:  alert    LABS:                        12.3   19.2  )-----------( 199      ( 2019 16:26 )             37.9     07-05    135  |  96  |  16  ----------------------------<  176<H>  3.6   |  29  |  0.79    Ca    7.6<L>      2019 06:48    TPro  4.8<L>  /  Alb  2.4<L>  /  TBili  0.5  /  DBili  0.2  /  AST  14  /  ALT  9<L>  /  AlkPhos  98  07-03    PT/INR - ( 2019 16:26 )   PT: 12.8 sec;   INR: 1.12 ratio         PTT - ( 2019 16:26 )  PTT:28.9 sec      Urinalysis Basic - ( 2019 16:26 )    Color: Orange / Appearance: Clear / S.030 / pH: x  Gluc: x / Ketone: Negative  / Bili: Negative / Urobili: Negative   Blood: x / Protein: Trace / Nitrite: Negative   Leuk Esterase: Negative / RBC: 71 /hpf / WBC 1 /HPF   Sq Epi: x / Non Sq Epi: 1 /hpf / Bacteria: Negative            Hemoglobin A1C, Whole Blood: 7.5 % ( @ 20:18)            Consultant(s) Notes Reviewed:      Care Discussed with Consultants/Other Providers:

## 2019-07-05 NOTE — CONSULT NOTE ADULT - PROBLEM SELECTOR RECOMMENDATION 9
- reported 10 cm ileocecal mass recently found at [place]  - as per son, Dr. Yanick Tim, he does not want any interventions given age and overall functional status   - will manage conservatively with symptomatic control  - dc planning per primary team   - diet as tolerated

## 2019-07-05 NOTE — DIETITIAN INITIAL EVALUATION ADULT. - OTHER INFO
Pt reports good appetite and PO intake at home, states "I always eat good"; states consuming a diet low in sugar for DM. Reports probably taking vitamins and nutritional supplements PTA, unable to recall which ones. Confirms NKFA. Reports monitoring BG 4 x day, unable to recall ranges or if taking any insulin/medications for BG at home. As per documentation, pt taking Glipizide PTA; HbA1c 7.5% (07/05) - indicates good BG control.     Confirmed information with RN. Pt reports good appetite and PO intake. As per RN, pt with good PO intake consumed 75% of breakfast this am. Offered nutritional supplementation - pt agreed for Glucerna, spoke to NP. Pt reports tolerating mechanical soft diet consistency. Pt denies nausea, vomiting, diarrhea, or constipation, last BM yesterday (07/04). Pt unable to recall weight changes PTA or UBW. Weight as per previous RD note (03/08/2019) 149.9 pounds with edema. Weight as per flow sheets (07/04) 152.7 pounds -?accuracy due to fluid shifts, unable to obtain accurate weight history/changes PTA at this time, will continue to monitor.      Provided recommendations to optimize PO and protein intake and help with skin breakdown, recommended small frequent meals by ordering nutrient-dense snacks and leaving food away from tray for later consumption during the day or between meals, to start with protein, and sips of supplement throughout the day; reviewed foods with protein and menu order procedures in hospital. Pt amenable for recommendations; states "whatever you send me, I will eat it."

## 2019-07-05 NOTE — DIETITIAN INITIAL EVALUATION ADULT. - DIET TYPE
Mechanical soft + Consistent Carbohydrate with snack Mechanical soft + Consistent Carbohydrate with snack diet + Glucerna Shake 240mls 2x daily (440kcals, 20g protein) to optimize kcal and protein intake and help with pressure ulcer healing. Spoke to NP. Defer diet/fluid consistencies to medical team/SLP recommendations. Will continue to monitor and adjust as needed.

## 2019-07-05 NOTE — PROGRESS NOTE ADULT - SUBJECTIVE AND OBJECTIVE BOX
CARDIOLOGY     PROGRESS  NOTE   ________________________________________________    CHIEF COMPLAINT:Patient is a 89y old  Male who presents with a chief complaint of FTT (05 Jul 2019 07:52)  doing better.  	  REVIEW OF SYSTEMS:  CONSTITUTIONAL: No fever, weight loss, or fatigue  EYES: No eye pain, visual disturbances, or discharge  ENT:  No difficulty hearing, tinnitus, vertigo; No sinus or throat pain  NECK: No pain or stiffness  RESPIRATORY: No cough, wheezing, chills or hemoptysis; + Shortness of Breath  CARDIOVASCULAR: No chest pain, palpitations, passing out, dizziness, or leg swelling  GASTROINTESTINAL: No abdominal or epigastric pain. No nausea, vomiting, or hematemesis; No diarrhea or constipation. No melena or hematochezia.  GENITOURINARY: No dysuria, frequency, hematuria, or incontinence  NEUROLOGICAL: No headaches, memory loss, loss of strength, numbness, or tremors  SKIN: No itching, burning, rashes, or lesions   LYMPH Nodes: No enlarged glands  ENDOCRINE: No heat or cold intolerance; No hair loss  MUSCULOSKELETAL: No joint pain or swelling; No muscle, back, or extremity pain  PSYCHIATRIC: No depression, anxiety, mood swings, or difficulty sleeping  HEME/LYMPH: No easy bruising, or bleeding gums  ALLERGY AND IMMUNOLOGIC: No hives or eczema	    [ ] All others negative	  [ ] Unable to obtain    PHYSICAL EXAM:  T(C): 36.6 (07-05-19 @ 04:06), Max: 36.7 (07-04-19 @ 11:41)  HR: 81 (07-05-19 @ 04:06) (66 - 85)  BP: 109/71 (07-05-19 @ 04:06) (109/71 - 120/75)  RR: 18 (07-05-19 @ 04:06) (18 - 19)  SpO2: 98% (07-05-19 @ 04:06) (92% - 98%)  Wt(kg): --  I&O's Summary    04 Jul 2019 07:01  -  05 Jul 2019 07:00  --------------------------------------------------------  IN: 420 mL / OUT: 300 mL / NET: 120 mL    05 Jul 2019 07:01  -  05 Jul 2019 08:48  --------------------------------------------------------  IN: 0 mL / OUT: 100 mL / NET: -100 mL        Appearance: Normal	  HEENT:   Normal oral mucosa, PERRL, EOMI	  Lymphatic: No lymphadenopathy  Cardiovascular: Normal S1 S2, No JVD, + murmurs, decrease  edema  Respiratory: rghonchi  Psychiatry: A & O x 3, Mood & affect appropriate  Gastrointestinal:  Soft, Non-tender, + BS	  Skin: No rashes, No ecchymoses, No cyanosis	  Neurologic: Non-focal  Extremities: Normal range of motion, No clubbing, cyanosis or edema  Vascular: Peripheral pulses palpable 2+ bilaterally    MEDICATIONS  (STANDING):  dextrose 5%. 1000 milliLiter(s) (50 mL/Hr) IV Continuous <Continuous>  dextrose 50% Injectable 12.5 Gram(s) IV Push once  dextrose 50% Injectable 25 Gram(s) IV Push once  dextrose 50% Injectable 25 Gram(s) IV Push once  diltiazem    Tablet 60 milliGRAM(s) Oral three times a day  furosemide   Injectable 20 milliGRAM(s) IV Push daily  heparin  Injectable 5000 Unit(s) SubCutaneous every 12 hours  insulin lispro (HumaLOG) corrective regimen sliding scale   SubCutaneous three times a day before meals      TELEMETRY: 	    ECG:  	  RADIOLOGY:  OTHER: 	  	  LABS:	 	    CARDIAC MARKERS:                                12.3   19.2  )-----------( 199      ( 03 Jul 2019 16:26 )             37.9     07-05    135  |  96  |  16  ----------------------------<  176<H>  3.6   |  29  |  0.79    Ca    7.6<L>      05 Jul 2019 06:48    TPro  4.8<L>  /  Alb  2.4<L>  /  TBili  0.5  /  DBili  0.2  /  AST  14  /  ALT  9<L>  /  AlkPhos  98  07-03    proBNP:   Lipid Profile:   HgA1c: Hemoglobin A1C, Whole Blood: 7.5 % (07-03 @ 20:18)    TSH:   PT/INR - ( 03 Jul 2019 16:26 )   PT: 12.8 sec;   INR: 1.12 ratio         PTT - ( 03 Jul 2019 16:26 )  PTT:28.9 sec    < from: VA Duplex Lower Ext Vein Scan, Bilat (07.05.19 @ 07:14) >  Bilateral posterior tibial veins and peroneal veins were not visualized,   limiting evaluation. No thrombosisin the remaining bilateral lower   extremity deep veins.    < end of copied text >    Assessment and plan  ---------------------------  le edema is improving with  diuretics  no obvious evidence of dvt no need for ivc filter  increase wbc check culture/ua  dvt prophylaxis  heart murmur  AORTIC STENOSIS avoid excess diuresis  oob to chair  physical therapy

## 2019-07-05 NOTE — DISCHARGE NOTE PROVIDER - NSDCCPCAREPLAN_GEN_ALL_CORE_FT
PRINCIPAL DISCHARGE DIAGNOSIS  Diagnosis: Colonic mass  Assessment and Plan of Treatment: Colonic mass -reported 10 cm ileocecal mass recently ([place])  As per son - Dr. Yanick Tim - he does not want any interventions given age and overall functional status   - will manage conservatively with symptomatic control  - diet as tolerated.        SECONDARY DISCHARGE DIAGNOSES  Diagnosis: FTT (failure to thrive) in adult  Assessment and Plan of Treatment: Seen and followed by nutrition    Diagnosis: Frequent falls  Assessment and Plan of Treatment: Fall precautions PRINCIPAL DISCHARGE DIAGNOSIS  Diagnosis: Colonic mass  Assessment and Plan of Treatment: Colonic mass -reported 10 cm ileocecal mass recently ([place])  As per son - Dr. Yanick Tim - he does not want any interventions given age and overall functional status   - will manage conservatively with symptomatic control  - diet as tolerated.        SECONDARY DISCHARGE DIAGNOSES  Diagnosis: Leukocytosis (leucocytosis)  Assessment and Plan of Treatment: Infectious work up negative       Diagnosis: FTT (failure to thrive) in adult  Assessment and Plan of Treatment: Seen and followed by nutrition    Diagnosis: Frequent falls  Assessment and Plan of Treatment: Fall precautions    Diagnosis: Multiple skin tears  Assessment and Plan of Treatment: Apply Mepital and cover with gauze and elsie    Diagnosis: Sacral ulcer  Assessment and Plan of Treatment: Cleanse PRINCIPAL DISCHARGE DIAGNOSIS  Diagnosis: Colonic mass  Assessment and Plan of Treatment: Colonic mass -reported 10 cm ileocecal mass recently ([place])  As per son - Dr. Yanick Tim - he does not want any interventions given age and overall functional status   - will manage conservatively with symptomatic control  - diet as tolerated.  Please follow up with your primary care physician after rehab        SECONDARY DISCHARGE DIAGNOSES  Diagnosis: Leukocytosis (leucocytosis)  Assessment and Plan of Treatment: YOu were evaluated by infectious disease doctor  Infectious work up negative  Monitored off antibiotic    Diagnosis: FTT (failure to thrive) in adult  Assessment and Plan of Treatment: Seen and followed by nutrition  Continue physical therapy at rehab   Supportive care at rehab    Diagnosis: Frequent falls  Assessment and Plan of Treatment: Fall precautions    Diagnosis: Multiple skin tears  Assessment and Plan of Treatment: Apply Mepital and cover with gauze and elsie    Diagnosis: Sacral ulcer  Assessment and Plan of Treatment: Continue wound care at rehab  Cleanse with normal saline, pat dry, apply Allevyn PRINCIPAL DISCHARGE DIAGNOSIS  Diagnosis: Colonic mass  Assessment and Plan of Treatment: Colonic mass -reported 10 cm ileocecal mass recently ([place])  As per son - Dr. Yanick Tim - he does not want any interventions given age and overall functional status   - will manage conservatively with symptomatic control  - diet as tolerated.  Please follow up with your primary care physician after rehab        SECONDARY DISCHARGE DIAGNOSES  Diagnosis: Leukocytosis (leucocytosis)  Assessment and Plan of Treatment: YOu were evaluated by infectious disease doctor  Infectious work up negative  Monitored off antibiotic    Diagnosis: FTT (failure to thrive) in adult  Assessment and Plan of Treatment: Seen and followed by nutrition  Continue physical therapy at rehab   Supportive care at rehab    Diagnosis: Pleural effusion  Assessment and Plan of Treatment: Bibasilar effusion on chest xray  O2 sat remain >95% on RA  Continue Lasix current dose    Diagnosis: Frequent falls  Assessment and Plan of Treatment: Fall precautions    Diagnosis: Multiple skin tears  Assessment and Plan of Treatment: Apply Mepital and cover with gauze and elsie    Diagnosis: Sacral ulcer  Assessment and Plan of Treatment: Continue wound care at rehab  Cleanse with normal saline, pat dry, apply Allevyn

## 2019-07-05 NOTE — PHYSICAL THERAPY INITIAL EVALUATION ADULT - PERTINENT HX OF CURRENT PROBLEM, REHAB EVAL
1 y/o M with dementia/ poor  historian, pmhx DM, CAD, A-Fib,  dementia,  falls, PE, not  on a/c, multiple  falls, hypothyroidism.  Pt sent to ER by nursing home? for  FTT, recent d/c  from [facility]  48 hrs prior. Pt denies of any chest pain, SOB. A/w FTT, Leukocytosis--> empiric Rocephin 89 y/o M with dementia/poor historian, pmhx DM, CAD, A-Fib,  dementia,  falls, PE, not  on a/c, multiple  falls, hypothyroidism.  Pt sent to ER by nursing home? for  FTT, recent d/c  from [facility]  48 hrs prior. Pt denies of any chest pain, SOB. A/w FTT, Leukocytosis--> empiric Rocephin

## 2019-07-05 NOTE — CONSULT NOTE ADULT - SUBJECTIVE AND OBJECTIVE BOX
Chief Complaint:  Patient is a 89y old  Male who presents with a chief complaint of FTT (2019 08:48)      HPI: 89 y/o M with dementia/ poor  historian with PMHx of DM, CAD, A-Fib,  dementia,  falls/  PE ,  not  on a/c, muliple  falls, hypothyroidism sent to er by rn home for FTT. Pt was discharged from [facility]  48 hrs  ago. Pt was found to have an ileocecal mass 10 cm as at [facility] as per son, Dr. Yanick Tim. I spoke with him over the phone and he does not want any further imaging/work up, or treatment for this mass. He wants his father to be managed conservatively. At time of examination, pt denies abdominal pain, nausea, vomiting. No reports of diarrhea, constipation, brbpr/melena.    Allergies:  vancomycin (Unknown)      Medications:  dextrose 40% Gel 15 Gram(s) Oral once PRN  dextrose 5%. 1000 milliLiter(s) IV Continuous <Continuous>  dextrose 50% Injectable 12.5 Gram(s) IV Push once  dextrose 50% Injectable 25 Gram(s) IV Push once  dextrose 50% Injectable 25 Gram(s) IV Push once  diltiazem    Tablet 60 milliGRAM(s) Oral three times a day  glucagon  Injectable 1 milliGRAM(s) IntraMuscular once PRN  heparin  Injectable 5000 Unit(s) SubCutaneous every 12 hours  insulin lispro (HumaLOG) corrective regimen sliding scale   SubCutaneous three times a day before meals      PMHX/PSHX:  A-fib  CAD (coronary artery disease)  Hypothyroidism  No pertinent past medical history  Diabetes  HLD (hyperlipidemia)  HTN (hypertension)  No significant past surgical history  No significant past surgical history      Family history:  No pertinent family history in first degree relatives      Social History:     ROS:     General:  No wt loss, fevers, chills, night sweats, fatigue,   Eyes:  Good vision, no reported pain  ENT:  No sore throat, pain, runny nose, dysphagia  CV:  No pain, palpitations, hypo/hypertension  Resp:  No dyspnea, cough, tachypnea, wheezing  GI:  No pain, No nausea, No vomiting, No diarrhea, No constipation, No weight loss, No fever, No pruritis, No rectal bleeding, No tarry stools, No dysphagia,  :  No pain, bleeding, incontinence, nocturia  Muscle:  No pain, weakness  Neuro:  No weakness, tingling, memory problems  Psych:  No fatigue, insomnia, mood problems, depression  Endocrine:  No polyuria, polydipsia, cold/heat intolerance  Heme:  No petechiae, ecchymosis, easy bruisability  Skin:  No rash, tattoos, scars, edema      PHYSICAL EXAM:   Vital Signs:  Vital Signs Last 24 Hrs  T(C): 36.6 (2019 04:06), Max: 36.6 (2019 04:06)  T(F): 97.8 (2019 04:06), Max: 97.8 (2019 04:06)  HR: 81 (2019 04:06) (81 - 85)  BP: 109/71 (2019 04:06) (109/71 - 119/75)  BP(mean): --  RR: 18 (2019 04:06) (18 - 19)  SpO2: 98% (2019 04:06) (94% - 98%)  Daily     Daily Weight in k.2 (2019 10:40)    GENERAL:  Appears stated age, well-groomed, well-nourished, no distress  HEENT:  NC/AT,  conjunctivae clear and pink, no thyromegaly, nodules, adenopathy, no JVD, sclera -anicteric  CHEST:  Full & symmetric excursion, no increased effort, breath sounds clear  HEART:  Regular rhythm, S1, S2, no murmur/rub/S3/S4, no abdominal bruit, no edema  ABDOMEN:  Soft, non-tender, non-distended, normoactive bowel sounds,  no masses ,no hepato-splenomegaly, no signs of chronic liver disease  EXTEREMITIES:  no cyanosis,clubbing or edema  SKIN:  No rash/erythema/ecchymoses/petechiae/wounds/abscess/warm/dry  NEURO:  AAO x 1-2    LABS:                        11.3   15.40 )-----------( 208      ( 2019 08:55 )             35.8     07-05    135  |  96  |  16  ----------------------------<  176<H>  3.6   |  29  |  0.79    Ca    7.6<L>      2019 06:48    TPro  4.8<L>  /  Alb  2.4<L>  /  TBili  0.5  /  DBili  0.2  /  AST  14  /  ALT  9<L>  /  AlkPhos  98  07-03    LIVER FUNCTIONS - ( 2019 16:26 )  Alb: 2.4 g/dL / Pro: 4.8 g/dL / ALK PHOS: 98 U/L / ALT: 9 U/L / AST: 14 U/L / GGT: x           PT/INR - ( 2019 16:26 )   PT: 12.8 sec;   INR: 1.12 ratio         PTT - ( 2019 16:26 )  PTT:28.9 sec  Urinalysis Basic - ( 2019 16:26 )    Color: Orange / Appearance: Clear / S.030 / pH: x  Gluc: x / Ketone: Negative  / Bili: Negative / Urobili: Negative   Blood: x / Protein: Trace / Nitrite: Negative   Leuk Esterase: Negative / RBC: 71 /hpf / WBC 1 /HPF   Sq Epi: x / Non Sq Epi: 1 /hpf / Bacteria: Negative          Imaging:

## 2019-07-05 NOTE — CONSULT NOTE ADULT - ASSESSMENT
89 y/o M with  dementia/ poor  historian with hx of  DM, CAD, A-Fib,  dementia,  falls/  PE ,   not  on a/c, multiple falls, hypothyroidism, sent er for  FTT. GI consulted for ileocecal mass.

## 2019-07-05 NOTE — PROGRESS NOTE ADULT - SUBJECTIVE AND OBJECTIVE BOX
CC: f/u for leukocytosis    Patient reports feeling well, no resp Sxs, pleasantly confused    REVIEW OF SYSTEMS:  All other review of systems negative (Comprehensive ROS)    Antimicrobials off   Medications Reviewed    T(F): 97.8 (19 @ 04:06), Max: 97.8 (19 @ 04:06)  HR: 81 (19 @ 04:06)  BP: 109/71 (19 @ 04:06)  RR: 18 (19 @ 04:06)  SpO2: 98% (19 @ 04:06)  Wt(kg): --    PHYSICAL EXAM:  General: alert, no acute distress  Eyes:  anicteric, no conjunctival injection, no discharge  Oropharynx: no lesions or injection 	  Neck: supple, without adenopathy  Lungs: clear to auscultation  Heart: regular rate and rhythm; no murmur, rubs or gallops  Abdomen: soft, nondistended, nontender, without mass or organomegaly  Skin: no lesions  Extremities: no clubbing, cyanosis, or edema  Neurologic: moves all extremities    LAB RESULTS:                        11.3   15.40 )-----------( 208      ( 2019 08:55 )             35.8     07-    135  |  96  |  16  ----------------------------<  176<H>  3.6   |  29  |  0.79    Ca    7.6<L>      2019 06:48    TPro  4.8<L>  /  Alb  2.4<L>  /  TBili  0.5  /  DBili  0.2  /  AST  14  /  ALT  9<L>  /  AlkPhos  98  07-    Urinalysis Basic - ( 2019 16:26 )    Color: Orange / Appearance: Clear / S.030 / pH: x  Gluc: x / Ketone: Negative  / Bili: Negative / Urobili: Negative   Blood: x / Protein: Trace / Nitrite: Negative   Leuk Esterase: Negative / RBC: 71 /hpf / WBC 1 /HPF   Sq Epi: x / Non Sq Epi: 1 /hpf / Bacteria: Negative      MICROBIOLOGY:  RECENT CULTURES:   @ 19:22 .Urine     No growth     @ 19:15 .Blood     No growth to date.    RADIOLOGY REVIEWED:  VA Duplex Lower Ext Vein Scan, Bilat (19 @ 07:14) >  Bilateral posterior tibial veins and peroneal veins were not visualized,   limiting evaluation. No thrombosisin the remaining bilateral lower   extremity deep veins.    Xray Chest 1 View AP/PA (19 @ 17:50) >  Small bilateral pleuraleffusions and bibasilar atelectasis.

## 2019-07-05 NOTE — PROGRESS NOTE ADULT - ASSESSMENT
89 yo male here with failure to thrive; hospitalization 3/'19 at [facility], discharged from [facility] on 6/29, now admitted to [facility].  He was treated for hypoxic respiratory failure secondary to CHF and received flagyl for GI PCR panel positive for Giardia.  His stool for O and P was negative, he completed 5 days of metronidazole and his diarrhea resolved.  CT with cecal mass, lymphoma was a concern, no biopsy done.  He had an elevated wbc which did not normalize.  [facility] records were reviewed, no infection, found to have leukocytosis, felt "reactive"  Remains alert, cooperative, afebrile, Cxs negative, CXR no infiltrate  WBC lower- leukocytosis remains nonspecific- cecal mass, etc  Observe off antibiotics

## 2019-07-05 NOTE — PHYSICAL THERAPY INITIAL EVALUATION ADULT - CRITERIA FOR SKILLED THERAPEUTIC INTERVENTIONS
impairments found/risk reduction/prevention/rehab potential/therapy frequency/anticipated discharge recommendation/predicted duration of therapy intervention/functional limitations in following categories

## 2019-07-05 NOTE — DIETITIAN INITIAL EVALUATION ADULT. - PHYSICAL APPEARANCE
other (specify)/Nutrition Focused Physical Exam deferred at this time due to pt confused; noted visual signs of moderate muscle loss in temporales, suspected normal due to older age; unable to visually assess other areas as pt covered in blankets. Ht: 67 inches stated per pt Wt: 152.7 pounds BMI: 23.9 kg/m2 IBW: 148 (+/-10%) 103.2 %IBW  Noted +3 left arm and +4 left leg and right arm edema as per flow sheets.   Skin: tear in right upper arm; pressure ulcer in left heel suspected deep tissue injury as per documentation.

## 2019-07-05 NOTE — PROGRESS NOTE ADULT - ASSESSMENT
89 y/o M with     PMHx significant for A-Fib (No AC 2/2 Hx falls),  hypothyroidism,  anemia, dementia  cecal mass/ no intervention done     presenting to the ED   with  FTT  apparently pt  has  been in different  hospitals/  wife at bedside  not  a good  historian either     DM2 ,  follow  fs/ glucose  in er,  was  208/ prior  admission, pt  was  on lantus 10 units    not on lantus now/       H/o Afib, on cardizem /  -not on AC due to  mlple  falsl      wbc of 18,000/  afebrile/  cxr no pna/   h/o  c/c  elevated wbc. / ?  from  possible malignancy/  cecal mass   pt with ?b/l leg cellulitis/  c/c  leg edema. ha s improved  pt with c/c elevtaed wbc  and h/o  Cecal  mass on ct, no intervention given advanced age   was on  Rocephin  oral  feeds/  iv fluids   pedal  edema/  on iv lasix/       pt has recent h/o PE and   has been deemed,  not  a candidate for a/c,  due to mlple falls/  not sure if pt has filter  no  ab. per  ID/  blood/  urine  c/s, negative  doppler legs, no dvt  pt is  DNR/ DNI  gi  eval, and  if   no  intervention, then may start d/c  planning 89 y/o M with     PMHx significant for A-Fib (No AC 2/2 Hx falls),  hypothyroidism,  anemia, dementia  cecal mass/ no intervention done     presenting to the ED   with  FTT  apparently pt  has  been in different  hospitals/  wife at bedside  not  a good  historian either     DM2 ,  follow  fs/ glucose  in er,  was  208/ prior  admission, pt  was  on lantus 10 units    not on lantus now/       H/o Afib, on cardizem /  -not on AC due to  mlple  falsl      wbc of 18,000/  afebrile/  cxr no pna/   h/o  c/c  elevated wbc. / ?  from  possible malignancy/  cecal mass   pt with ?b/l leg cellulitis/  c/c  leg edema. ha s improved  pt with c/c elevtaed wbc  and h/o  Cecal  mass on ct, no intervention given advanced age   was on  Rocephin  oral  feeds/   pedal  edema/  on iv lasix/       pt has recent h/o PE and   has been deemed,  not  a candidate for a/c,  due to mlple falls/  not sure if pt has filter  no  ab. per  ID/  blood/  urine  c/s, negative  doppler legs, no dvt  chronic  elevated w bc  pt is  DNR/ DNI  gi  eval, and  if   no  intervention, then may start d/c  planning   addendum,  per  son.  who is a  md,  sttaed that pt   has   a known  10  cm  mass,  ileo cecal on prior  ct  and  wants no  intervention  therfore  may  proceed  with  d/c  planning to rehab that  pt  came  from 89 y/o M with     PMHx significant for A-Fib (No AC 2/2 Hx falls),  hypothyroidism,  anemia, dementia  cecal mass/ no intervention done     presenting to the ED   with  FTT  apparently pt  has  been in different  hospitals/  wife at bedside  not  a good  historian either     DM2 ,  follow  fs/ glucose  in er,  was  208/ prior  admission, pt  was  on lantus 10 units    not on lantus now/       H/o Afib, on cardizem /  -not on AC due to  mlple  falsl      wbc of 18,000/  afebrile/  cxr no pna/   h/o  c/c  elevated wbc. / ?  from  possible malignancy/  cecal mass   pt with ?b/l leg cellulitis/  c/c  leg edema. ha s improved  pt with c/c elevtaed wbc  and h/o  Cecal  mass on ct, no intervention given advanced age   was on  Rocephin  oral  feeds/   pedal  edema/  on iv lasix/       pt has recent h/o PE and   has been deemed,  not  a candidate for a/c,  due to mlple falls/  not sure if pt has filter  no  ab. per  ID/  blood/  urine  c/s, negative  doppler legs, no dvt  chronic  elevated w bc  pt is  DNR/ DNI  gi  eval, and  if   no  intervention, then may start d/c  planning   addendum,  per  son.  who is a  md,  sttaed that pt   has   a known  10  cm  mass,  ileo cecal on prior  ct  and  wants no  intervention  prandin  0.5  mg,  with breakfast  and n home to  monitor fs and adjust accordingly  therefore  may  proceed  with  d/c  planning to rehab that  pt  came  from

## 2019-07-05 NOTE — DIETITIAN INITIAL EVALUATION ADULT. - ENERGY NEEDS
Pertinent information as per chart: Pt 89 y/o M with PMH: dementia, DM, CAD, A-fib, multiple falls, PE, hypothyroidism, HTN, HLD, admitted with FTT, lower extremity edema, doing better; had episode of hypoglycemia yesterday (07/04). Pt likely to get discharged today as per NP. Pertinent information as per chart: Pt 89 y/o M with PMH: dementia, DM, CAD, A-fib, multiple falls, PE, hypothyroidism, HTN, HLD, admitted with FTT, lower extremity edema, doing better; had episode of hypoglycemia yesterday (07/04). Pt likely to get discharged today as per NP. Pt DNR/DNI.

## 2019-07-05 NOTE — DISCHARGE NOTE PROVIDER - HOSPITAL COURSE
This is a 89 y/o male with hx of dementia, PMHx significant for A-Fib (No AC 2/2 Hx falls),  hypothyroidism,  anemia, dementia presenting to the ED on 7/3 with FTT apparently pt has been in different  hospitals. for same. Wife was at the at bedside and is not able to detail any history; pt was just d/c'd from [facility] 48 hrs ago. The pt. was admitted for Dx: FTT & Leukocytosis and received empiric Rocephin. Seen and followed by GI and ID.  At [place] - treated for hypoxic respiratory failure secondary to CHF and received flagyl for GI PCR panel positive for Giardia. The stool for O and P was negative and he completed 5 days of metronidazole and his diarrhea resolved. Subsequently he was dc'd. Pt. had CT with cecal mass, and concern for lymphoma, no biopsy done.  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 Yanick Tim, he does not want any interventions given age and overall functional status; will manage conservatively, diet as tolerated. As per ID records were reviewed, no infection, found to have leukocytosis, ? "reactive" (reviewed [facility]). The pt. remains alert, cooperative, afebrile, cx's negative, CXR no infiltrate and WBC lower - leukocytosis remains nonspecific- cecal mass as above. On 7/5 seen by PT and plan is for discharge to [facility]. This is a 89 y/o male with hx of dementia, PMHx significant for A-Fib (No AC 2/2 Hx falls),  hypothyroidism,  anemia, dementia presenting to the ED on 7/3 with FTT apparently pt has been in different  hospitals. for same. Wife was at the at bedside and is not able to detail any history; pt was just d/c'd from [facility] 48 hrs ago. The pt. was admitted for Dx: FTT & Leukocytosis and received empiric Rocephin. Seen and followed by GI and ID and had CT with cecal mass, and concern for lymphoma.  As per GI; + mass / 10 cm ileocecal mass (found at [place]) as per son, Dr. Herndon Tim, he does not want any interventions given age and overall functional status; will manage conservatively, diet as tolerated. As per ID records were reviewed, no infection, found to have leukocytosis, ? "reactive" (reviewed [facility]). The pt. remains alert, cooperative, afebrile, cx's negative, CXR no infiltrate and WBC lower - leukocytosis remains nonspecific- cecal mass as above. On 7/5 seen by PT and discharged to [facility] on 7/6.

## 2019-07-05 NOTE — DIETITIAN INITIAL EVALUATION ADULT. - REASON INDICATOR FOR ASSESSMENT
Nutrition consult received for pressure ulcer >2.   Pt confused with history of dementia as per documentation, no family at bedside -?accuracy of limited information obtained.  Reviewed medical record and previous RD note; spoke to RN and NP.

## 2019-07-05 NOTE — DIETITIAN INITIAL EVALUATION ADULT. - ADD RECOMMEND
1. Encourage PO intake, obtain food preferences (pt did not have any at this time), provide feeding assistance as needed. 2) Recommend Multivitamin and Vitamin C to help with pressure ulcer healing. 3) Provided recommendations to optimize PO/protein intake. 4) Obtain current weight to identify changes if any. 1. Encourage PO intake, obtain food preferences (pt did not have any at this time), provide feeding assistance as needed. 2) Recommend Multivitamin and Vitamin C to help with pressure ulcer healing. 3) Provided recommendations to optimize PO/protein intake.

## 2019-07-06 VITALS
OXYGEN SATURATION: 98 % | RESPIRATION RATE: 18 BRPM | SYSTOLIC BLOOD PRESSURE: 106 MMHG | DIASTOLIC BLOOD PRESSURE: 70 MMHG | HEART RATE: 84 BPM | TEMPERATURE: 98 F

## 2019-07-06 LAB
GLUCOSE BLDC GLUCOMTR-MCNC: 151 MG/DL — HIGH (ref 70–99)
GLUCOSE BLDC GLUCOMTR-MCNC: 186 MG/DL — HIGH (ref 70–99)

## 2019-07-06 PROCEDURE — 74018 RADEX ABDOMEN 1 VIEW: CPT

## 2019-07-06 PROCEDURE — 87086 URINE CULTURE/COLONY COUNT: CPT

## 2019-07-06 PROCEDURE — 51701 INSERT BLADDER CATHETER: CPT

## 2019-07-06 PROCEDURE — 71045 X-RAY EXAM CHEST 1 VIEW: CPT | Mod: 26

## 2019-07-06 PROCEDURE — 82248 BILIRUBIN DIRECT: CPT

## 2019-07-06 PROCEDURE — 80048 BASIC METABOLIC PNL TOTAL CA: CPT

## 2019-07-06 PROCEDURE — 96374 THER/PROPH/DIAG INJ IV PUSH: CPT | Mod: XU

## 2019-07-06 PROCEDURE — 93005 ELECTROCARDIOGRAM TRACING: CPT | Mod: XU

## 2019-07-06 PROCEDURE — 97162 PT EVAL MOD COMPLEX 30 MIN: CPT

## 2019-07-06 PROCEDURE — 93970 EXTREMITY STUDY: CPT

## 2019-07-06 PROCEDURE — 71045 X-RAY EXAM CHEST 1 VIEW: CPT

## 2019-07-06 PROCEDURE — 87040 BLOOD CULTURE FOR BACTERIA: CPT

## 2019-07-06 PROCEDURE — 81001 URINALYSIS AUTO W/SCOPE: CPT

## 2019-07-06 PROCEDURE — 85610 PROTHROMBIN TIME: CPT

## 2019-07-06 PROCEDURE — 85027 COMPLETE CBC AUTOMATED: CPT

## 2019-07-06 PROCEDURE — 99285 EMERGENCY DEPT VISIT HI MDM: CPT | Mod: 25

## 2019-07-06 PROCEDURE — 80053 COMPREHEN METABOLIC PANEL: CPT

## 2019-07-06 PROCEDURE — 83036 HEMOGLOBIN GLYCOSYLATED A1C: CPT

## 2019-07-06 PROCEDURE — 85730 THROMBOPLASTIN TIME PARTIAL: CPT

## 2019-07-06 PROCEDURE — 82962 GLUCOSE BLOOD TEST: CPT

## 2019-07-06 RX ORDER — ASPIRIN/CALCIUM CARB/MAGNESIUM 324 MG
1 TABLET ORAL
Qty: 0 | Refills: 0 | DISCHARGE
Start: 2019-07-06

## 2019-07-06 RX ORDER — FUROSEMIDE 40 MG
1 TABLET ORAL
Qty: 0 | Refills: 0 | DISCHARGE

## 2019-07-06 RX ORDER — DILTIAZEM HCL 120 MG
1 CAPSULE, EXT RELEASE 24 HR ORAL
Qty: 0 | Refills: 0 | DISCHARGE
Start: 2019-07-06

## 2019-07-06 RX ORDER — TAMSULOSIN HYDROCHLORIDE 0.4 MG/1
1 CAPSULE ORAL
Qty: 0 | Refills: 0 | DISCHARGE

## 2019-07-06 RX ORDER — FUROSEMIDE 40 MG
40 TABLET ORAL DAILY
Refills: 0 | Status: DISCONTINUED | OUTPATIENT
Start: 2019-07-06 | End: 2019-07-06

## 2019-07-06 RX ORDER — LEVOTHYROXINE SODIUM 125 MCG
1 TABLET ORAL
Qty: 0 | Refills: 0 | DISCHARGE
Start: 2019-07-06

## 2019-07-06 RX ORDER — ASPIRIN/CALCIUM CARB/MAGNESIUM 324 MG
81 TABLET ORAL DAILY
Refills: 0 | Status: DISCONTINUED | OUTPATIENT
Start: 2019-07-06 | End: 2019-07-06

## 2019-07-06 RX ORDER — REPAGLINIDE 1 MG/1
1 TABLET ORAL
Qty: 0 | Refills: 0 | DISCHARGE

## 2019-07-06 RX ORDER — LEVOTHYROXINE SODIUM 125 MCG
150 TABLET ORAL DAILY
Refills: 0 | Status: DISCONTINUED | OUTPATIENT
Start: 2019-07-06 | End: 2019-07-06

## 2019-07-06 RX ORDER — LEVOTHYROXINE SODIUM 125 MCG
1 TABLET ORAL
Qty: 0 | Refills: 0 | DISCHARGE

## 2019-07-06 RX ORDER — FUROSEMIDE 40 MG
1 TABLET ORAL
Qty: 0 | Refills: 0 | DISCHARGE
Start: 2019-07-06

## 2019-07-06 RX ADMIN — Medication 40 MILLIGRAM(S): at 10:56

## 2019-07-06 RX ADMIN — HEPARIN SODIUM 5000 UNIT(S): 5000 INJECTION INTRAVENOUS; SUBCUTANEOUS at 05:28

## 2019-07-06 RX ADMIN — Medication 1: at 08:53

## 2019-07-06 RX ADMIN — Medication 60 MILLIGRAM(S): at 05:28

## 2019-07-06 RX ADMIN — Medication 150 MICROGRAM(S): at 10:57

## 2019-07-06 RX ADMIN — Medication 81 MILLIGRAM(S): at 10:56

## 2019-07-06 RX ADMIN — Medication 1: at 13:12

## 2019-07-06 RX ADMIN — Medication 60 MILLIGRAM(S): at 13:13

## 2019-07-06 NOTE — PROGRESS NOTE ADULT - ASSESSMENT
89 y/o M with     PMHx significant for A-Fib (No AC 2/2 Hx falls),  hypothyroidism,  anemia, dementia  cecal mass/ no intervention done     presenting to the ED   with  FTT  apparently pt  has  been in different  hospitals/  wife at bedside  not  a good  historian either     DM2 ,  follow  fs/ glucose  in er,  was  208/ prior  admission, pt  was  on lantus 10 units    not on lantus now/       H/o Afib, on cardizem /  -not on AC due to  mlple  falsl      wbc of 18,000/  afebrile/  cxr no pna/   h/o  c/c  elevated wbc. / ?  from  possible malignancy/  cecal mass   pt with ?b/l leg cellulitis/  c/c  leg edema. ha s improved  pt with c/c elevtaed wbc  and h/o  Cecal  mass on ct, no intervention given advanced age   was on  Rocephin  oral  feeds/   pedal  edema/  on iv lasix/       pt has recent h/o PE and   has been deemed,  not  a candidate for a/c,  due to mlple falls/  not sure if pt has filter  no  ab. per  ID/  blood/  urine  c/s, negative  doppler legs, no dvt  chronic  elevated w bc  pt is  DNR/ DNI  gi  eval,  per  son.  who is a  md,  sttaed that pt   has   a known  10  cm  mass,  ileo cecal on prior  ct  and  wants no  intervention  prandin  0.5  mg,  with breakfast  and n home to  monitor fs and adjust accordingly    proceed  with  d/c  planning to rehab  today pe r care  cortn note 89 y/o M with     PMHx significant for A-Fib (No AC 2/2 Hx falls),  hypothyroidism,  anemia, dementia  cecal mass/ no intervention done     presenting to the ED   with  FTT  apparently pt  has  been in different  hospitals/  wife at bedside  not  a good  historian either     DM2 ,  follow  fs/ glucose  in er,  was  208/ prior  admission, pt  was  on lantus 10 units    not on lantus now/       H/o Afib, on cardizem /  -not on AC due to  mlple  falsl      wbc of 18,000/  afebrile/  cxr no pna/   h/o  c/c  elevated wbc. / ?  from  possible malignancy/  cecal mass   pt with ?b/l leg cellulitis/  c/c  leg edema. ha s improved  pt with c/c elevtaed wbc  and h/o  Cecal  mass on ct, no intervention given advanced age   was on  Rocephin  oral  feeds/   pedal  edema/  on iv lasix/       pt has recent h/o PE and   has been deemed,  not  a candidate for a/c,  due to mlple falls/  not sure if pt has filter  no  ab. per  ID/  blood/  urine  c/s, negative  doppler legs, no dvt  chronic  elevated w bc  pt is  DNR/ DNI  gi  eval,  per  son.  who is a  md,  sttaed that pt   has   a known  10  cm  mass,  ileo cecal on prior  ct  and  wants no  intervention  prandin  0.5  mg,  with breakfast  and n home to  monitor fs and adjust accordingly    proceed  with  d/c  planning to rehab  today pe  care  cortn note     addendum/  early  this am O2  sat was 98    9.58 am, notified  by np, that O2  sat  is in high 70's/  stat  cxr. pending  need  to  cancel  d/c 89 y/o M with     PMHx significant for A-Fib (No AC 2/2 Hx falls),  hypothyroidism,  anemia, dementia  cecal mass/ no intervention done     presenting to the ED   with  FTT  apparently pt  has  been in different  hospitals/  wife at bedside  not  a good  historian either     DM2 ,  follow  fs/ glucose  in er,  was  208/ prior  admission, pt  was  on lantus 10 units    not on lantus now/       H/o Afib, on cardizem /  -not on AC due to  mlple  falsl      wbc of 18,000/  afebrile/  cxr no pna/   h/o  c/c  elevated wbc. / ?  from  possible malignancy/  cecal mass   pt with ?b/l leg cellulitis/  c/c  leg edema. ha s improved  pt with c/c elevtaed wbc  and h/o  Cecal  mass on ct, no intervention given advanced age   was on  Rocephin  oral  feeds/   pedal  edema/  on iv lasix/       pt has recent h/o PE and   has been deemed,  not  a candidate for a/c,  due to mlple falls/  not sure if pt has filter  no  ab. per  ID/  blood/  urine  c/s, negative  doppler legs, no dvt  chronic  elevated w bc  pt is  DNR/ DNI  gi  eval,  per  son.  who is a  md,  sttaed that pt   has   a known  10  cm  mass,  ileo cecal on prior  ct  and  wants no  intervention  prandin  0.5  mg,  with breakfast  and n home to  monitor fs and adjust accordingly    proceed  with  d/c  planning to rehab  today pe  care  cortn note     addendum/  early  this am O2  sat was 98    9.58 am, notified  by np, that O2  sat  is in high 70's/  stat  cxr. pending   pt  with raynauds  with  purplish  finger tips, with  false  O2  sat/  sat  recorded  on fore  head, is  above  94   awaiting  cxr. if stable, then proceed  with  d/c  a s planned    discussed  with  np/ kevin

## 2019-07-06 NOTE — PROGRESS NOTE ADULT - PROVIDER SPECIALTY LIST ADULT
Cardiology
Cardiology
Infectious Disease
Internal Medicine
Cardiology

## 2019-07-06 NOTE — PROGRESS NOTE ADULT - SUBJECTIVE AND OBJECTIVE BOX
CC: f/u for leukocytosis    Patient still feeling well, no resp Sxs, pleasantly confused    REVIEW OF SYSTEMS:  All other review of systems negative (Comprehensive ROS)    Antimicrobials off   Medications Reviewed    Vital Signs Last 24 Hrs  T(F): 97.9 (2019 04:07), Max: 97.9 (2019 04:07)  HR: 88 (2019 04:07) (88 - 90)  BP: 119/75 (2019 04:07) (100/68 - 122/64)  BP(mean): --  RR: 18 (2019 04:07) (18 - 18)  SpO2: 98% (2019 04:07) (98% - 98%)    PHYSICAL EXAM:  General: alert, no acute distress  Eyes:  anicteric, no conjunctival injection, no discharge  Oropharynx: no lesions or injection 	  Neck: supple, without adenopathy  Lungs: clear to auscultation  Heart: regular rate and rhythm; no murmur, rubs or gallops  Abdomen: soft, nondistended, nontender, without mass or organomegaly  Skin: no lesions  Extremities: no clubbing, cyanosis, or edema  Neurologic: moves all extremities    LAB RESULTS:                        11.3   15.40 )-----------( 208      ( 2019 08:55 )             35.8   07-    135  |  96  |  16  ----------------------------<  176<H>  3.6   |  29  |  0.79    Ca    7.6<L>      2019 06:48    Urinalysis Basic - ( 2019 16:26 )    Color: Orange / Appearance: Clear / S.030 / pH: x  Gluc: x / Ketone: Negative  / Bili: Negative / Urobili: Negative   Blood: x / Protein: Trace / Nitrite: Negative   Leuk Esterase: Negative / RBC: 71 /hpf / WBC 1 /HPF   Sq Epi: x / Non Sq Epi: 1 /hpf / Bacteria: Negative      MICROBIOLOGY:  RECENT CULTURES:   @ 19:22 .Urine     No growth     @ 19:15 .Blood     No growth to date.    RADIOLOGY REVIEWED:  VA Duplex Lower Ext Vein Scan, Bilat (19 @ 07:14) >  Bilateral posterior tibial veins and peroneal veins were not visualized,   limiting evaluation. No thrombosisin the remaining bilateral lower   extremity deep veins.    Xray Chest 1 View AP/PA (. @ 17:50) >  Small bilateral pleuraleffusions and bibasilar atelectasis.

## 2019-07-06 NOTE — PROGRESS NOTE ADULT - SUBJECTIVE AND OBJECTIVE BOX
CARDIOLOGY     PROGRESS  NOTE   ________________________________________________    CHIEF COMPLAINT:Patient is a 89y old  Male who presents with a chief complaint of FTT (05 Jul 2019 17:19)  no complain.  	  REVIEW OF SYSTEMS:  CONSTITUTIONAL: No fever, weight loss, or fatigue  EYES: No eye pain, visual disturbances, or discharge  ENT:  No difficulty hearing, tinnitus, vertigo; No sinus or throat pain  NECK: No pain or stiffness  RESPIRATORY: No cough, wheezing, chills or hemoptysis; = Shortness of Breath  CARDIOVASCULAR: No chest pain, palpitations, passing out, dizziness, or leg swelling  GASTROINTESTINAL: No abdominal or epigastric pain. No nausea, vomiting, or hematemesis; No diarrhea or constipation. No melena or hematochezia.  GENITOURINARY: No dysuria, frequency, hematuria, or incontinence  NEUROLOGICAL: No headaches, memory loss, loss of strength, numbness, or tremors  SKIN: No itching, burning, rashes, or lesions   LYMPH Nodes: No enlarged glands  ENDOCRINE: No heat or cold intolerance; No hair loss  MUSCULOSKELETAL: No joint pain or swelling; No muscle, back, or extremity pain  PSYCHIATRIC: No depression, anxiety, mood swings, or difficulty sleeping  HEME/LYMPH: No easy bruising, or bleeding gums  ALLERGY AND IMMUNOLOGIC: No hives or eczema	    [ ] All others negative	  [ ] Unable to obtain    PHYSICAL EXAM:  T(C): 36.6 (07-06-19 @ 04:07), Max: 36.6 (07-05-19 @ 14:40)  HR: 88 (07-06-19 @ 04:07) (88 - 90)  BP: 119/75 (07-06-19 @ 04:07) (100/68 - 122/64)  RR: 18 (07-06-19 @ 04:07) (18 - 18)  SpO2: 98% (07-06-19 @ 04:07) (98% - 98%)  Wt(kg): --  I&O's Summary    05 Jul 2019 07:01  -  06 Jul 2019 07:00  --------------------------------------------------------  IN: 480 mL / OUT: 650 mL / NET: -170 mL        Appearance: Normal	  HEENT:   Normal oral mucosa, PERRL, EOMI	  Lymphatic: No lymphadenopathy  Cardiovascular: Normal S1 S2, No JVD, + murmurs, No edema  Respiratory: rhonchi	  Psychiatry: A & O x 3, Mood & affect appropriate  Gastrointestinal:  Soft, Non-tender, + BS	  Skin: No rashes, No ecchymoses, No cyanosis	  Neurologic: Non-focal  Extremities: Normal range of motion, No clubbing, cyanosis or edema  Vascular: Peripheral pulses palpable 2+ bilaterally    MEDICATIONS  (STANDING):  dextrose 5%. 1000 milliLiter(s) (50 mL/Hr) IV Continuous <Continuous>  dextrose 50% Injectable 12.5 Gram(s) IV Push once  dextrose 50% Injectable 25 Gram(s) IV Push once  dextrose 50% Injectable 25 Gram(s) IV Push once  diltiazem    Tablet 60 milliGRAM(s) Oral three times a day  heparin  Injectable 5000 Unit(s) SubCutaneous every 12 hours  insulin lispro (HumaLOG) corrective regimen sliding scale   SubCutaneous three times a day before meals      TELEMETRY: 	    ECG:  	  RADIOLOGY:  OTHER: 	  	  LABS:	 	    CARDIAC MARKERS:                                11.3   15.40 )-----------( 208      ( 05 Jul 2019 08:55 )             35.8     07-05    135  |  96  |  16  ----------------------------<  176<H>  3.6   |  29  |  0.79    Ca    7.6<L>      05 Jul 2019 06:48      proBNP:   Lipid Profile:   HgA1c: Hemoglobin A1C, Whole Blood: 7.5 % (07-05 @ 08:55)  Hemoglobin A1C, Whole Blood: 7.5 % (07-03 @ 20:18)    TSH:   < from: 12 Lead ECG (07.03.19 @ 16:16) >  Diagnosis Line ATRIAL FIBRILLATION WITH RAPID VENTRICULAR RESPONSE  LEFT AXIS DEVIATION  NONSPECIFIC ST AND T WAVE ABNORMALITY  ABNORMAL ECG    < end of copied text >  < from: CT Chest No Cont (08.18.18 @ 18:19) >  IMPRESSION:   1. No evidence of acute fracture. No evidence of pneumothorax. No   evidence of   pleural fluid.   2. Mild up to 3.6 cm ectasia of ascending thoracic aorta.   3. Mild bilateral lower lobe dependent atelectasis/scarring.    < end of copied text >        Assessment and plan  ---------------------------  a.fib on no ac ? ? Eliquis  add asa daily  dvt prophylaxis  over all hr is controlled  tsh  physical therapy

## 2019-07-06 NOTE — PROGRESS NOTE ADULT - SUBJECTIVE AND OBJECTIVE BOX
no  cp/sob  REVIEW OF SYSTEMS:  GEN: no fever,    no chills  RESP: no SOB,   no cough  CVS: no chest pain,   no palpitations  GI: no abdominal pain,   no nausea,   no vomiting,   no constipation,   no diarrhea  : no dysuria,   no frequency  NEURO: no headache,   no dizziness  PSYCH: no depression,   not anxious  Derm : no rash    MEDICATIONS  (STANDING):  aspirin enteric coated 81 milliGRAM(s) Oral daily  dextrose 5%. 1000 milliLiter(s) (50 mL/Hr) IV Continuous <Continuous>  dextrose 50% Injectable 12.5 Gram(s) IV Push once  dextrose 50% Injectable 25 Gram(s) IV Push once  dextrose 50% Injectable 25 Gram(s) IV Push once  diltiazem    Tablet 60 milliGRAM(s) Oral three times a day  heparin  Injectable 5000 Unit(s) SubCutaneous every 12 hours  insulin lispro (HumaLOG) corrective regimen sliding scale   SubCutaneous three times a day before meals    MEDICATIONS  (PRN):  dextrose 40% Gel 15 Gram(s) Oral once PRN Blood Glucose LESS THAN 70 milliGRAM(s)/deciliter  glucagon  Injectable 1 milliGRAM(s) IntraMuscular once PRN Glucose LESS THAN 70 milligrams/deciliter      Vital Signs Last 24 Hrs  T(C): 36.6 (06 Jul 2019 04:07), Max: 36.6 (05 Jul 2019 14:40)  T(F): 97.9 (06 Jul 2019 04:07), Max: 97.9 (06 Jul 2019 04:07)  HR: 88 (06 Jul 2019 04:07) (88 - 90)  BP: 119/75 (06 Jul 2019 04:07) (100/68 - 122/64)  BP(mean): --  RR: 18 (06 Jul 2019 04:07) (18 - 18)  SpO2: 98% (06 Jul 2019 04:07) (98% - 98%)  CAPILLARY BLOOD GLUCOSE      POCT Blood Glucose.: 158 mg/dL (05 Jul 2019 22:16)  POCT Blood Glucose.: 130 mg/dL (05 Jul 2019 17:24)  POCT Blood Glucose.: 199 mg/dL (05 Jul 2019 11:57)    I&O's Summary    05 Jul 2019 07:01  -  06 Jul 2019 07:00  --------------------------------------------------------  IN: 480 mL / OUT: 650 mL / NET: -170 mL        PHYSICAL EXAM:  HEAD:  Atraumatic, Normocephalic  NECK: Supple, No   JVD  CHEST/LUNG:   no     rales,     no,    rhonchi  HEART: Regular rate and rhythm;         murmur  ABDOMEN: Soft, Nontender, ;   EXTREMITIES:    no    edema  NEUROLOGY:  alert    LABS:                        11.3   15.40 )-----------( 208      ( 05 Jul 2019 08:55 )             35.8     07-05    135  |  96  |  16  ----------------------------<  176<H>  3.6   |  29  |  0.79    Ca    7.6<L>      05 Jul 2019 06:48                    Hemoglobin A1C, Whole Blood: 7.5 % (07-05 @ 08:55)            Consultant(s) Notes Reviewed:      Care Discussed with Consultants/Other Providers: no  cp/sob/  O 2  sat  was  98  at 4  am   per np now is  in 70/s/  cxr  REVIEW OF SYSTEMS:  GEN: no fever,    no chills  RESP: no SOB,   no cough  CVS: no chest pain,   no palpitations  GI: no abdominal pain,   no nausea,   no vomiting,   no constipation,   no diarrhea  : no dysuria,   no frequency  NEURO: no headache,   no dizziness  PSYCH: no depression,   not anxious  Derm : no rash    MEDICATIONS  (STANDING):  aspirin enteric coated 81 milliGRAM(s) Oral daily  dextrose 5%. 1000 milliLiter(s) (50 mL/Hr) IV Continuous <Continuous>  dextrose 50% Injectable 12.5 Gram(s) IV Push once  dextrose 50% Injectable 25 Gram(s) IV Push once  dextrose 50% Injectable 25 Gram(s) IV Push once  diltiazem    Tablet 60 milliGRAM(s) Oral three times a day  heparin  Injectable 5000 Unit(s) SubCutaneous every 12 hours  insulin lispro (HumaLOG) corrective regimen sliding scale   SubCutaneous three times a day before meals    MEDICATIONS  (PRN):  dextrose 40% Gel 15 Gram(s) Oral once PRN Blood Glucose LESS THAN 70 milliGRAM(s)/deciliter  glucagon  Injectable 1 milliGRAM(s) IntraMuscular once PRN Glucose LESS THAN 70 milligrams/deciliter      Vital Signs Last 24 Hrs  T(C): 36.6 (06 Jul 2019 04:07), Max: 36.6 (05 Jul 2019 14:40)  T(F): 97.9 (06 Jul 2019 04:07), Max: 97.9 (06 Jul 2019 04:07)  HR: 88 (06 Jul 2019 04:07) (88 - 90)  BP: 119/75 (06 Jul 2019 04:07) (100/68 - 122/64)  BP(mean): --  RR: 18 (06 Jul 2019 04:07) (18 - 18)  SpO2: 98% (06 Jul 2019 04:07) (98% - 98%)  CAPILLARY BLOOD GLUCOSE      POCT Blood Glucose.: 158 mg/dL (05 Jul 2019 22:16)  POCT Blood Glucose.: 130 mg/dL (05 Jul 2019 17:24)  POCT Blood Glucose.: 199 mg/dL (05 Jul 2019 11:57)    I&O's Summary    05 Jul 2019 07:01  -  06 Jul 2019 07:00  --------------------------------------------------------  IN: 480 mL / OUT: 650 mL / NET: -170 mL        PHYSICAL EXAM:  HEAD:  Atraumatic, Normocephalic  NECK: Supple, No   JVD  CHEST/LUNG:   no     rales,     no,    rhonchi  HEART: Regular rate and rhythm;         murmur  ABDOMEN: Soft, Nontender, ;   EXTREMITIES:    no    edema  NEUROLOGY:  alert    LABS:                        11.3   15.40 )-----------( 208      ( 05 Jul 2019 08:55 )             35.8     07-05    135  |  96  |  16  ----------------------------<  176<H>  3.6   |  29  |  0.79    Ca    7.6<L>      05 Jul 2019 06:48                    Hemoglobin A1C, Whole Blood: 7.5 % (07-05 @ 08:55)            Consultant(s) Notes Reviewed:      Care Discussed with Consultants/Other Providers: no  cp/sob/  O 2  sat  was  98  at 4  am   per np now is  in 70/s/  pt  with raunauds  disease.  no cp/sob  REVIEW OF SYSTEMS:  GEN: no fever,    no chills  RESP: no SOB,   no cough  CVS: no chest pain,   no palpitations  GI: no abdominal pain,   no nausea,   no vomiting,   no constipation,   no diarrhea  : no dysuria,   no frequency  NEURO: no headache,   no dizziness  PSYCH: no depression,   not anxious  Derm : no rash    MEDICATIONS  (STANDING):  aspirin enteric coated 81 milliGRAM(s) Oral daily  dextrose 5%. 1000 milliLiter(s) (50 mL/Hr) IV Continuous <Continuous>  dextrose 50% Injectable 12.5 Gram(s) IV Push once  dextrose 50% Injectable 25 Gram(s) IV Push once  dextrose 50% Injectable 25 Gram(s) IV Push once  diltiazem    Tablet 60 milliGRAM(s) Oral three times a day  heparin  Injectable 5000 Unit(s) SubCutaneous every 12 hours  insulin lispro (HumaLOG) corrective regimen sliding scale   SubCutaneous three times a day before meals    MEDICATIONS  (PRN):  dextrose 40% Gel 15 Gram(s) Oral once PRN Blood Glucose LESS THAN 70 milliGRAM(s)/deciliter  glucagon  Injectable 1 milliGRAM(s) IntraMuscular once PRN Glucose LESS THAN 70 milligrams/deciliter      Vital Signs Last 24 Hrs  T(C): 36.6 (06 Jul 2019 04:07), Max: 36.6 (05 Jul 2019 14:40)  T(F): 97.9 (06 Jul 2019 04:07), Max: 97.9 (06 Jul 2019 04:07)  HR: 88 (06 Jul 2019 04:07) (88 - 90)  BP: 119/75 (06 Jul 2019 04:07) (100/68 - 122/64)  BP(mean): --  RR: 18 (06 Jul 2019 04:07) (18 - 18)  SpO2: 98% (06 Jul 2019 04:07) (98% - 98%)  CAPILLARY BLOOD GLUCOSE      POCT Blood Glucose.: 158 mg/dL (05 Jul 2019 22:16)  POCT Blood Glucose.: 130 mg/dL (05 Jul 2019 17:24)  POCT Blood Glucose.: 199 mg/dL (05 Jul 2019 11:57)    I&O's Summary    05 Jul 2019 07:01 - 06 Jul 2019 07:00  --------------------------------------------------------  IN: 480 mL / OUT: 650 mL / NET: -170 mL        PHYSICAL EXAM:  HEAD:  Atraumatic, Normocephalic  NECK: Supple, No   JVD  CHEST/LUNG:   no     rales,     no,    rhonchi  HEART: Regular rate and rhythm;         murmur  ABDOMEN: Soft, Nontender, ;   EXTREMITIES:    no    edema  NEUROLOGY:  alert    LABS:                        11.3   15.40 )-----------( 208      ( 05 Jul 2019 08:55 )             35.8     07-05    135  |  96  |  16  ----------------------------<  176<H>  3.6   |  29  |  0.79    Ca    7.6<L>      05 Jul 2019 06:48                    Hemoglobin A1C, Whole Blood: 7.5 % (07-05 @ 08:55)            Consultant(s) Notes Reviewed:      Care Discussed with Consultants/Other Providers:

## 2019-07-06 NOTE — CHART NOTE - NSCHARTNOTEFT_GEN_A_CORE
CXR for O2 sat 77% on Finger. However noted pt has Raynaud's, O2 sat > 95% on forehead. CXR showed Bilateral pleural effusion. Bibasilar pneumonia and/or atelectasis.   Result discussed with radiologist and Dr. Upton. Lasix 40 mg PO X1 given ( home dose), pt to continue Lasix 20 mg daily at rehab as BPs soft as discussed with Dr. Upton  Cleared for discharge by Dr. Upton. Discharged to HANNAH Jimenez NP-C  #[id number]

## 2019-07-06 NOTE — PROGRESS NOTE ADULT - ASSESSMENT
89 yo male here with failure to thrive; hospitalization 3/'19 at [facility], discharged from [facility] on 6/29, now admitted to [facility].  He was treated for hypoxic respiratory failure secondary to CHF and received flagyl for GI PCR panel positive for Giardia.  His stool for O and P was negative, he completed 5 days of metronidazole and his diarrhea resolved.  CT with cecal mass, lymphoma was a concern, no biopsy done.  He had an elevated wbc which did not normalize.  [facility] records were reviewed, no infection, leukocytosis, felt "reactive"  Remains alert, cooperative, afebrile, Cxs reviewed and negative, CXR no infiltrate  WBC lower- leukocytosis remains nonspecific- cecal mass, etc  Observe off antibiotics

## 2019-07-08 LAB
CULTURE RESULTS: SIGNIFICANT CHANGE UP
CULTURE RESULTS: SIGNIFICANT CHANGE UP
SPECIMEN SOURCE: SIGNIFICANT CHANGE UP
SPECIMEN SOURCE: SIGNIFICANT CHANGE UP

## 2020-02-27 NOTE — PATIENT PROFILE ADULT - NSPROPOAURINARYCATHETER_GEN_A_NUR

## 2020-06-21 NOTE — PHYSICAL THERAPY INITIAL EVALUATION ADULT - TRANSFER SAFETY CONCERNS NOTED: SIT/STAND, REHAB EVAL
early pregnancy options counselling decreased step length/decreased safety awareness/losing balance/decreased weight-shifting ability

## 2020-08-07 NOTE — ED ADULT NURSE NOTE - FALL HARM RISK
age(85 years old or older)
no loss of consciousness, no gait abnormality, no headache, no sensory deficits, and no weakness.

## 2020-10-09 NOTE — DIETITIAN INITIAL EVALUATION ADULT. - 30 CAL TO
How Severe Are Your Spot(S)?: mild What Is The Reason For Today's Visit?: Full Body Skin Examination What Is The Reason For Today's Visit? (Being Monitored For X): concerning skin lesions on an annual basis [id number]

## 2022-11-11 NOTE — PHYSICAL THERAPY INITIAL EVALUATION ADULT - ADL SKILLS, REHAB EVAL
Goal Outcome Evaluation:   Facial swelling continues to improve. Pt. Hoping to be able to discharge tomorrow.               needed assist

## 2023-01-17 NOTE — ED ADULT NURSE NOTE - NSFALLRSKOUTCOME_ED_ALL_ED
And notified of results via E message.  Normal hemogram and ferritin level  No treatment required.
Fall with Harm Risk

## 2023-11-17 NOTE — GOALS OF CARE CONVERSATION - PERSONAL ADVANCE DIRECTIVE - CONVERSATION DETAILS
ENT follow up scheduled for 11/21 at 115 pm. Pt aware of appt details. Pt was sent to the ED from the [facility] ([facility]) r/t a possible fall, FTT and increasing weakness. Pt was receiving home hospice services at the [facility] since 6/29/19. Met with the pt, spouse and son  Yanick Tim in the ED. The pt does not have any symptoms requiring an inpatient level of care; Dr Tim verbalized understanding. Revocation form signed by Dr Flores r/t pt seeking aggressive care outside the [facility] plan of care. Family to seek LTC placement as the pt now requires a higher level of care than the [facility] can provide. Dr Jackson ([id number]) made aware of above. Thank you.

## 2024-11-15 NOTE — DISCHARGE NOTE ADULT - INFLUENZA IMMUNIZATION DATE (APPROXIMATE):
Daughter reports no nausea/vomiting; notes constipation; last BM 3 days ago per daughter; bowel regimen: miralax, senna, PRN dulcolax 11-Oct-2019

## 2025-03-19 NOTE — PATIENT PROFILE ADULT - EQUIPMENT CURRENTLY USED AT HOME
Patient arrives requesting prescriptions filled. Was seen in ED 3/13 and 16 Dx RSV He sates he was to have prescriptions for inhaler and cough medication, they were not available at [address] pharmacy yesterday.  A/O on arrival  
yes